# Patient Record
Sex: MALE | Race: WHITE | NOT HISPANIC OR LATINO | Employment: OTHER | URBAN - METROPOLITAN AREA
[De-identification: names, ages, dates, MRNs, and addresses within clinical notes are randomized per-mention and may not be internally consistent; named-entity substitution may affect disease eponyms.]

---

## 2017-03-01 RX ORDER — LANCETS
EACH MISCELLANEOUS
Refills: 0 | OUTPATIENT
Start: 2017-03-01

## 2017-03-01 NOTE — TELEPHONE ENCOUNTER
Lancets      Last Written Prescription Date: 8/23/16  Last Fill Quantity: 1 box,  # refills: 11   Last Office Visit with G, P or [facility] prescribing provider: 7/12/16    Rhonda Fry CMA

## 2017-03-01 NOTE — TELEPHONE ENCOUNTER
Patient should have 6 months of supplies left, request denied.   Nadia Johnson RN   [facility] - Triage

## 2017-03-14 ENCOUNTER — OFFICE VISIT (OUTPATIENT)
Dept: FAMILY MEDICINE | Facility: CLINIC | Age: 72
End: 2017-03-14
Payer: COMMERCIAL

## 2017-03-14 VITALS
WEIGHT: 145 LBS | HEART RATE: 63 BPM | SYSTOLIC BLOOD PRESSURE: 128 MMHG | DIASTOLIC BLOOD PRESSURE: 74 MMHG | BODY MASS INDEX: 20.3 KG/M2 | TEMPERATURE: 97.6 F | OXYGEN SATURATION: 97 % | HEIGHT: 71 IN

## 2017-03-14 DIAGNOSIS — E78.2 MIXED HYPERLIPIDEMIA: ICD-10-CM

## 2017-03-14 DIAGNOSIS — Z23 NEED FOR VACCINATION: ICD-10-CM

## 2017-03-14 LAB
CREAT UR-MCNC: 127 MG/DL
HBA1C MFR BLD: 6.8 % (ref 4.3–6)
MICROALBUMIN UR-MCNC: 48 MG/L
MICROALBUMIN/CREAT UR: 37.4 MG/G CR (ref 0–17)

## 2017-03-14 PROCEDURE — 36415 COLL VENOUS BLD VENIPUNCTURE: CPT | Performed by: FAMILY MEDICINE

## 2017-03-14 PROCEDURE — 83036 HEMOGLOBIN GLYCOSYLATED A1C: CPT | Performed by: FAMILY MEDICINE

## 2017-03-14 PROCEDURE — 82043 UR ALBUMIN QUANTITATIVE: CPT | Performed by: FAMILY MEDICINE

## 2017-03-14 PROCEDURE — 90715 TDAP VACCINE 7 YRS/> IM: CPT | Performed by: FAMILY MEDICINE

## 2017-03-14 PROCEDURE — G0009 ADMIN PNEUMOCOCCAL VACCINE: HCPCS | Performed by: FAMILY MEDICINE

## 2017-03-14 PROCEDURE — 80061 LIPID PANEL: CPT | Performed by: FAMILY MEDICINE

## 2017-03-14 PROCEDURE — 80048 BASIC METABOLIC PNL TOTAL CA: CPT | Performed by: FAMILY MEDICINE

## 2017-03-14 PROCEDURE — 90670 PCV13 VACCINE IM: CPT | Performed by: FAMILY MEDICINE

## 2017-03-14 PROCEDURE — 99213 OFFICE O/P EST LOW 20 MIN: CPT | Mod: 25 | Performed by: FAMILY MEDICINE

## 2017-03-14 PROCEDURE — 84460 ALANINE AMINO (ALT) (SGPT): CPT | Performed by: FAMILY MEDICINE

## 2017-03-14 PROCEDURE — G0008 ADMIN INFLUENZA VIRUS VAC: HCPCS | Performed by: FAMILY MEDICINE

## 2017-03-14 RX ORDER — SIMVASTATIN 20 MG
20 TABLET ORAL AT BEDTIME
Qty: 90 TABLET | Refills: 3 | Status: SHIPPED | OUTPATIENT
Start: 2017-03-14 | End: 2018-02-21

## 2017-03-14 RX ORDER — METFORMIN HYDROCHLORIDE 750 MG/1
TABLET, EXTENDED RELEASE ORAL
Qty: 90 TABLET | Refills: 1 | Status: CANCELLED | OUTPATIENT
Start: 2017-03-14

## 2017-03-14 RX ORDER — METFORMIN HYDROCHLORIDE 750 MG/1
TABLET, EXTENDED RELEASE ORAL
Qty: 90 TABLET | Refills: 1 | Status: SHIPPED | OUTPATIENT
Start: 2017-03-14 | End: 2017-09-13

## 2017-03-14 NOTE — MR AVS SNAPSHOT
After Visit Summary   3/14/2017    Yury Noriega    MRN: [id number]           Patient Information     Date Of Birth          1945        Visit Information        Provider Department      3/14/2017 10:20 AM Zhou Emmanuel MD [facility]        Today's Diagnoses     Uncontrolled diabetes mellitus type 2 without complications, unspecified long term insulin use status (H)    -  1    Mixed hyperlipidemia           Follow-ups after your visit        Your next 10 appointments already scheduled     Mar 14, 2017 10:20 AM CDT   Office Visit with Zhou Emmanuel MD   [facility] ([facility])    [address] [place] [id number]   [phone]           Bring a current list of meds and any records pertaining to this visit.  For Physicals, please bring immunization records and any forms needing to be filled out.  Please arrive 10 minutes early to complete paperwork.              Who to contact     If you have questions or need follow up information about today's clinic visit or your schedule please contact [facility] directly at [phone].  Normal or non-critical lab and imaging results will be communicated to you by Storm Exchangehart, letter or phone within 4 business days after the clinic has received the results. If you do not hear from us within 7 days, please contact the clinic through Orggert or phone. If you have a critical or abnormal lab result, we will notify you by phone as soon as possible.  Submit refill requests through [employer] or call your pharmacy and they will forward the refill request to us. Please allow 3 business days for your refill to be completed.          Additional Information About Your Visit        Storm Exchangehart Information     [employer] gives you secure access to your electronic health record. If you see a primary care provider, you can also send messages to your care team and make appointments. If you  "have questions, please call your primary care clinic.  If you do not have a primary care provider, please call [phone] and they will assist you.        Care EveryWhere ID     This is your Care EveryWhere ID. This could be used by other organizations to access your [facility] medical records  [id number]        Your Vitals Were     Pulse Temperature Height Pulse Oximetry BMI (Body Mass Index)       63 97.6  F (36.4  C) (Tympanic) 5' 10.5\" (1.791 m) 97% 20.51 kg/m2        Blood Pressure from Last 3 Encounters:   03/14/17 128/74   07/12/16 120/74   02/09/16 132/72    Weight from Last 3 Encounters:   03/14/17 145 lb (65.8 kg)   07/12/16 150 lb (68 kg)   02/09/16 152 lb (68.9 kg)              We Performed the Following     Albumin Random Urine Quantitative     ALT     Basic metabolic panel  (Ca, Cl, CO2, Creat, Gluc, K, Na, BUN)     FOOT EXAM     Hemoglobin A1c     Lipid Profile with reflex to direct LDL          Today's Medication Changes          These changes are accurate as of: 3/14/17 10:19 AM.  If you have any questions, ask your nurse or doctor.               These medicines have changed or have updated prescriptions.        Dose/Directions    metFORMIN 750 MG 24 hr tablet   Commonly known as:  GLUCOPHAGE-XR   This may have changed:  See the new instructions.   Used for:  Uncontrolled diabetes mellitus type 2 without complications, unspecified long term insulin use status (H), Mixed hyperlipidemia   Changed by:  Zhou Emmanuel MD        TAKE 1 TABLET BY MOUTH DAILY WITH DINNER   Quantity:  90 tablet   Refills:  1       simvastatin 20 MG tablet   Commonly known as:  ZOCOR   This may have changed:  See the new instructions.   Used for:  Uncontrolled diabetes mellitus type 2 without complications, unspecified long term insulin use status (H), Mixed hyperlipidemia   Changed by:  Zhou Emmanuel MD        Dose:  20 mg   Take 1 tablet (20 mg) by mouth At Bedtime   Quantity:  90 tablet   Refills:  3            Where to " get your medicines      These medications were sent to [facility] - [place], [place] - [address]  AT [facility] [address]  [address] , [facility] [address]     Phone:  [phone]     blood glucose monitoring lancets    metFORMIN 750 MG 24 hr tablet    simvastatin 20 MG tablet                Primary Care Provider Office Phone # Fax #    Zhou Emmanuel [phone]       [address] [address]        Thank you!     Thank you for choosing [facility]  for your care. Our goal is always to provide you with excellent care. Hearing back from our patients is one way we can continue to improve our services. Please take a few minutes to complete the written survey that you may receive in the mail after your visit with us. Thank you!             Your Updated Medication List - Protect others around you: Learn how to safely use, store and throw away your medicines at www.disposemymeds.org.          This list is accurate as of: 3/14/17 10:19 AM.  Always use your most recent med list.                   Brand Name Dispense Instructions for use    * blood glucose monitoring lancets     1 Box    Test 1 time per day       * blood glucose monitoring lancets     1 Box    Use to test blood sugar 1 times daily or as directed.       blood glucose monitoring meter device kit     1 kit    Use to test blood sugars 1 times daily or as directed.       * blood glucose monitoring test strip    ACCU-CHEK SMARTVIEW    100 each    Use to test blood sugar 1 times daily or as directed.       * blood glucose monitoring test strip    no brand specified    100 each    Use to test blood sugars 1 times daily or as directed       * blood glucose monitoring test strip    no brand specified    1 Box    Use to test blood sugars 1 times daily or as directed       lisinopril 2.5 MG tablet    PRINIVIL/Zestril    90 tablet    TAKE 1 TABLET BY MOUTH  DAILY       metFORMIN 750 MG 24 hr tablet    GLUCOPHAGE-XR    90 tablet    TAKE 1 TABLET BY MOUTH DAILY WITH DINNER       Multi-vitamin Tabs tablet      Take 1 tablet by mouth daily       simvastatin 20 MG tablet    ZOCOR    90 tablet    Take 1 tablet (20 mg) by mouth At Bedtime       * Notice:  This list has 5 medication(s) that are the same as other medications prescribed for you. Read the directions carefully, and ask your doctor or other care provider to review them with you.

## 2017-03-14 NOTE — NURSING NOTE
"Chief Complaint   Patient presents with     Diabetes       Initial /74  Pulse 63  Temp 97.6  F (36.4  C) (Tympanic)  Ht 5' 10.5\" (1.791 m)  Wt 145 lb (65.8 kg)  SpO2 97%  BMI 20.51 kg/m2 Estimated body mass index is 20.51 kg/(m^2) as calculated from the following:    Height as of this encounter: 5' 10.5\" (1.791 m).    Weight as of this encounter: 145 lb (65.8 kg).  Medication Reconciliation: complete     Rhonda Fry CMA      "

## 2017-03-14 NOTE — PROGRESS NOTES
SUBJECTIVE:                                                    Yury Noriega is a 71 year old male who presents to clinic today for the following health issues:      Diabetes Follow-up    Patient is checking blood sugars: once daily.  Results are as follows:         am - 114    Diabetic concerns: None     Symptoms of hypoglycemia (low blood sugar): none     Paresthesias (numbness or burning in feet) or sores: No     Date of last diabetic eye exam: 3/2015       Amount of exercise or physical activity: walking    Problems taking medications regularly: No    Medication side effects: none  Diet: regular (no restrictions)    Problem list and histories reviewed & adjusted, as indicated.  Additional history: as documented    Patient Active Problem List   Diagnosis     BPH (benign prostatic hyperplasia)     CARDIOVASCULAR SCREENING; LDL GOAL LESS THAN 160     Type 2 diabetes mellitus, uncontrolled (H)     Advance Care Planning     Past Surgical History   Procedure Laterality Date     As closed rx femur shaft fx       Colonoscopy N/A 1/5/2016     Procedure: COLONOSCOPY;  Surgeon: Jose Luis Nunez MD;  Location:  GI       Social History   Substance Use Topics     Smoking status: Never Smoker     Smokeless tobacco: Never Used     Alcohol use Yes      Comment: rarely     Family History   Problem Relation Age of Onset     DIABETES Mother          Current Outpatient Prescriptions   Medication Sig Dispense Refill     blood glucose monitoring (ACCU-CHEK MULTICLIX) lancets Use to test blood sugar 1 times daily or as directed. 1 Box 11     simvastatin (ZOCOR) 20 MG tablet Take 1 tablet (20 mg) by mouth At Bedtime 90 tablet 3     metFORMIN (GLUCOPHAGE-XR) 750 MG 24 hr tablet TAKE 1 TABLET BY MOUTH DAILY WITH DINNER 90 tablet 1     blood glucose monitoring (ACCU-CHEK DIONICIO PLUS) meter device kit Use to test blood sugars 1 times daily or as directed. 1 kit 0     blood glucose monitoring (NO BRAND SPECIFIED) test strip Use to  "test blood sugars 1 times daily or as directed 1 Box 11     lisinopril (PRINIVIL,ZESTRIL) 2.5 MG tablet TAKE 1 TABLET BY MOUTH DAILY 90 tablet 3     blood glucose monitoring (ACCU-CHEK FASTCLIX) lancets Test 1 time per day 1 Box 3     blood glucose monitoring (NO BRAND SPECIFIED) test strip Use to test blood sugars 1 times daily or as directed 100 each 3     blood glucose monitoring (ACCU-CHEK SMARTVIEW) test strip Use to test blood sugar 1 times daily or as directed. 100 each 2     multivitamin, therapeutic with minerals (MULTI-VITAMIN) TABS Take 1 tablet by mouth daily       [DISCONTINUED] metFORMIN (GLUCOPHAGE-XR) 750 MG 24 hr tablet TAKE 1 TABLET BY MOUTH DAILY WITH DINNER 90 tablet 1     [DISCONTINUED] simvastatin (ZOCOR) 20 MG tablet TAKE 1 TABLET BY MOUTH EVERY NIGHT AT BEDTIME 90 tablet 3     No Known Allergies  Recent Labs   Lab Test  07/12/16   0921  02/09/16   1206  12/01/15   0903  10/19/15   1116   A1C  6.9*  6.6*  8.0*   --    LDL   --   59   --   167*   TRIG   --    --   78   --    CR  1.12   --   1.00   --    GFRESTIMATED  65   --   74   --    GFRESTBLACK  79   --   90   --    POTASSIUM  4.7   --   3.9   --       BP Readings from Last 3 Encounters:   03/14/17 128/74   07/12/16 120/74   02/09/16 132/72    Wt Readings from Last 3 Encounters:   03/14/17 145 lb (65.8 kg)   07/12/16 150 lb (68 kg)   02/09/16 152 lb (68.9 kg)                    Reviewed and updated as needed this visit by clinical staff  Tobacco  Allergies  Meds       Reviewed and updated as needed this visit by Provider         ROS:  Constitutional, HEENT, cardiovascular, pulmonary, gi and gu systems are negative, except as otherwise noted.    OBJECTIVE:                                                    /74  Pulse 63  Temp 97.6  F (36.4  C) (Tympanic)  Ht 5' 10.5\" (1.791 m)  Wt 145 lb (65.8 kg)  SpO2 97%  BMI 20.51 kg/m2  Body mass index is 20.51 kg/(m^2).  GENERAL: healthy, alert and no distress  NECK: no adenopathy, no " asymmetry, masses, or scars and thyroid normal to palpation  RESP: lungs clear to auscultation - no rales, rhonchi or wheezes  CV: regular rate and rhythm, normal S1 S2, no S3 or S4, no murmur, click or rub, no peripheral edema and peripheral pulses strong  ABDOMEN: soft, nontender, no hepatosplenomegaly, no masses and bowel sounds normal  MS: no gross musculoskeletal defects noted, no edema         ASSESSMENT/PLAN:                                                    Yury was seen today for diabetes.    Diagnoses and all orders for this visit:    Uncontrolled diabetes mellitus type 2 without complications, unspecified long term insulin use status (H)  -     blood glucose monitoring (ACCU-CHEK MULTICLIX) lancets; Use to test blood sugar 1 times daily or as directed.  -     simvastatin (ZOCOR) 20 MG tablet; Take 1 tablet (20 mg) by mouth At Bedtime  -     metFORMIN (GLUCOPHAGE-XR) 750 MG 24 hr tablet; TAKE 1 TABLET BY MOUTH DAILY WITH DINNER  -     Hemoglobin A1c  -     Basic metabolic panel  (Ca, Cl, CO2, Creat, Gluc, K, Na, BUN)  -     ALT  -     Lipid Profile with reflex to direct LDL  -     Albumin Random Urine Quantitative  -     FOOT EXAM    Mixed hyperlipidemia  -     blood glucose monitoring (ACCU-CHEK MULTICLIX) lancets; Use to test blood sugar 1 times daily or as directed.  -     simvastatin (ZOCOR) 20 MG tablet; Take 1 tablet (20 mg) by mouth At Bedtime  -     metFORMIN (GLUCOPHAGE-XR) 750 MG 24 hr tablet; TAKE 1 TABLET BY MOUTH DAILY WITH DINNER  -     Hemoglobin A1c  -     Basic metabolic panel  (Ca, Cl, CO2, Creat, Gluc, K, Na, BUN)  -     ALT  -     Lipid Profile with reflex to direct LDL  -     Albumin Random Urine Quantitative    Other orders  -     Cancel: metFORMIN (GLUCOPHAGE-XR) 750 MG 24 hr tablet;           FUTURE APPOINTMENTS:       - Follow-up visit in 6 months     Zhou Emmanuel MD  [facility]

## 2017-03-15 LAB
ALT SERPL W P-5'-P-CCNC: 22 U/L (ref 0–70)
ANION GAP SERPL CALCULATED.3IONS-SCNC: 8 MMOL/L (ref 3–14)
BUN SERPL-MCNC: 30 MG/DL (ref 7–30)
CALCIUM SERPL-MCNC: 8.8 MG/DL (ref 8.5–10.1)
CHLORIDE SERPL-SCNC: 101 MMOL/L (ref 94–109)
CHOLEST SERPL-MCNC: 158 MG/DL
CO2 SERPL-SCNC: 28 MMOL/L (ref 20–32)
CREAT SERPL-MCNC: 0.9 MG/DL (ref 0.66–1.25)
GFR SERPL CREATININE-BSD FRML MDRD: 84 ML/MIN/1.7M2
GLUCOSE SERPL-MCNC: 136 MG/DL (ref 70–99)
HDLC SERPL-MCNC: 55 MG/DL
LDLC SERPL CALC-MCNC: 87 MG/DL
NONHDLC SERPL-MCNC: 103 MG/DL
POTASSIUM SERPL-SCNC: 3.7 MMOL/L (ref 3.4–5.3)
SODIUM SERPL-SCNC: 137 MMOL/L (ref 133–144)
TRIGL SERPL-MCNC: 79 MG/DL

## 2017-03-16 ENCOUNTER — TELEPHONE (OUTPATIENT)
Dept: FAMILY MEDICINE | Facility: CLINIC | Age: 72
End: 2017-03-16

## 2017-03-16 NOTE — TELEPHONE ENCOUNTER
Spoke with pt he will check with his insurance and call back and let us know what machine is covered.    Rhonda Fry CMA

## 2017-03-16 NOTE — TELEPHONE ENCOUNTER
Fax from pt's pharmacy stating his accu-check lancets are not covered.    Do you want to change med or proceed with PA?    Rhonda Fry CMA

## 2017-07-03 ENCOUNTER — TRANSFERRED RECORDS (OUTPATIENT)
Dept: HEALTH INFORMATION MANAGEMENT | Facility: CLINIC | Age: 72
End: 2017-07-03

## 2017-08-07 RX ORDER — LISINOPRIL 2.5 MG/1
TABLET ORAL
Qty: 90 TABLET | Refills: 1 | Status: SHIPPED | OUTPATIENT
Start: 2017-08-07 | End: 2018-01-29

## 2017-08-07 NOTE — TELEPHONE ENCOUNTER
Lisinopril      Last Written Prescription Date: 8/9/16  Last Fill Quantity: 90, # refills: 3  Last Office Visit with [facility], [facility] or [facility] prescribing provider: 3/14/17       Potassium   Date Value Ref Range Status   03/14/2017 3.7 3.4 - 5.3 mmol/L Final     Creatinine   Date Value Ref Range Status   03/14/2017 0.90 0.66 - 1.25 mg/dL Final     BP Readings from Last 3 Encounters:   03/14/17 128/74   07/12/16 120/74   02/09/16 132/72     Rhonda Fry CMA

## 2017-08-07 NOTE — TELEPHONE ENCOUNTER
Prescription approved per [facility], [facility] or [facility] refill protocol.  Lindsey Mendez RN - Triage  [facility]

## 2017-09-13 ENCOUNTER — OFFICE VISIT (OUTPATIENT)
Dept: FAMILY MEDICINE | Facility: CLINIC | Age: 72
End: 2017-09-13
Payer: COMMERCIAL

## 2017-09-13 VITALS
HEIGHT: 71 IN | SYSTOLIC BLOOD PRESSURE: 125 MMHG | BODY MASS INDEX: 20.3 KG/M2 | HEART RATE: 79 BPM | RESPIRATION RATE: 16 BRPM | WEIGHT: 145 LBS | DIASTOLIC BLOOD PRESSURE: 80 MMHG | OXYGEN SATURATION: 97 % | TEMPERATURE: 97.1 F

## 2017-09-13 DIAGNOSIS — Z11.59 NEED FOR HEPATITIS C SCREENING TEST: ICD-10-CM

## 2017-09-13 DIAGNOSIS — Z23 NEED FOR PROPHYLACTIC VACCINATION AND INOCULATION AGAINST INFLUENZA: ICD-10-CM

## 2017-09-13 DIAGNOSIS — E78.2 MIXED HYPERLIPIDEMIA: ICD-10-CM

## 2017-09-13 LAB — HBA1C MFR BLD: 6.5 % (ref 4.3–6)

## 2017-09-13 PROCEDURE — 90471 IMMUNIZATION ADMIN: CPT | Performed by: FAMILY MEDICINE

## 2017-09-13 PROCEDURE — 84460 ALANINE AMINO (ALT) (SGPT): CPT | Performed by: FAMILY MEDICINE

## 2017-09-13 PROCEDURE — 90662 IIV NO PRSV INCREASED AG IM: CPT | Performed by: FAMILY MEDICINE

## 2017-09-13 PROCEDURE — 36415 COLL VENOUS BLD VENIPUNCTURE: CPT | Performed by: FAMILY MEDICINE

## 2017-09-13 PROCEDURE — 80048 BASIC METABOLIC PNL TOTAL CA: CPT | Performed by: FAMILY MEDICINE

## 2017-09-13 PROCEDURE — 86803 HEPATITIS C AB TEST: CPT | Performed by: FAMILY MEDICINE

## 2017-09-13 PROCEDURE — 99214 OFFICE O/P EST MOD 30 MIN: CPT | Mod: 25 | Performed by: FAMILY MEDICINE

## 2017-09-13 PROCEDURE — 83036 HEMOGLOBIN GLYCOSYLATED A1C: CPT | Performed by: FAMILY MEDICINE

## 2017-09-13 RX ORDER — METFORMIN HYDROCHLORIDE 750 MG/1
TABLET, EXTENDED RELEASE ORAL
Qty: 90 TABLET | Refills: 1 | Status: SHIPPED | OUTPATIENT
Start: 2017-09-13 | End: 2018-02-21

## 2017-09-13 NOTE — NURSING NOTE
"Chief Complaint   Patient presents with     Diabetes       Initial /80 (Cuff Size: Adult Regular)  Pulse 79  Temp 97.1  F (36.2  C) (Tympanic)  Resp 16  Ht 5' 10.5\" (1.791 m)  Wt 145 lb (65.8 kg)  SpO2 97%  BMI 20.51 kg/m2 Estimated body mass index is 20.51 kg/(m^2) as calculated from the following:    Height as of this encounter: 5' 10.5\" (1.791 m).    Weight as of this encounter: 145 lb (65.8 kg).  Medication Reconciliation: complete   Meri Bustillo, CMA    "

## 2017-09-13 NOTE — PROGRESS NOTES
Injectable Influenza Immunization Documentation    1.  Are you sick today? (Fever of 100.5 or higher on the day of the clinic)   No    2.  Have you ever had Guillain-[place] Syndrome within 6 weeks of an influenza vaccionation?  No    3. Do you have a life-threatening allergy to eggs?  No    4. Do you have a life-threatening allergy to a component of the vaccine? May include antibiotics, gelatin or latex.  No     5. Have you ever had a reaction to a dose of flu vaccine that needed immediate medical attention?  No     Form completed by Meri Bustillo, [facility] [address]

## 2017-09-13 NOTE — MR AVS SNAPSHOT
After Visit Summary   9/13/2017    Yury Noriega    MRN: [id number]           Patient Information     Date Of Birth          1945        Visit Information        Provider Department      9/13/2017 1:40 PM Zhou Emmanuel MD [facility]        Today's Diagnoses     Uncontrolled diabetes mellitus type 2 without complications, unspecified long term insulin use status (H)    -  1    Need for hepatitis C screening test        Need for prophylactic vaccination and inoculation against influenza        Mixed hyperlipidemia           Follow-ups after your visit        Who to contact     If you have questions or need follow up information about today's clinic visit or your schedule please contact [facility] directly at [phone].  Normal or non-critical lab and imaging results will be communicated to you by [employer], letter or phone within 4 business days after the clinic has received the results. If you do not hear from us within 7 days, please contact the clinic through [employer] or phone. If you have a critical or abnormal lab result, we will notify you by phone as soon as possible.  Submit refill requests through ROCKI or call your pharmacy and they will forward the refill request to us. Please allow 3 business days for your refill to be completed.          Additional Information About Your Visit        MyChart Information     [employer] gives you secure access to your electronic health record. If you see a primary care provider, you can also send messages to your care team and make appointments. If you have questions, please call your primary care clinic.  If you do not have a primary care provider, please call [phone] and they will assist you.        Care EveryWhere ID     This is your Care EveryWhere ID. This could be used by other organizations to access your [place] medical records  [id number]        Your Vitals Were     Pulse Temperature  "Respirations Height Pulse Oximetry BMI (Body Mass Index)    79 97.1  F (36.2  C) (Tympanic) 16 5' 10.5\" (1.791 m) 97% 20.51 kg/m2       Blood Pressure from Last 3 Encounters:   09/13/17 125/80   03/14/17 128/74   07/12/16 120/74    Weight from Last 3 Encounters:   09/13/17 145 lb (65.8 kg)   03/14/17 145 lb (65.8 kg)   07/12/16 150 lb (68 kg)              We Performed the Following     ALT     Basic metabolic panel  (Ca, Cl, CO2, Creat, Gluc, K, Na, BUN)     FLU VACCINE, INCREASED ANTIGEN, PRESV FREE, AGE 65+ [91660]     HEMOGLOBIN A1C     Hepatitis C Screen Reflex to HCV RNA Quant and Genotype     Vaccine Administration, Initial [73840]          Where to get your medicines      These medications were sent to [facility] [address] [place] [address] [place] [address] [address] AT [facility]  [address] [place] [address]     Phone:  [phone]     metFORMIN 750 MG 24 hr tablet          Primary Care Provider Office Phone # Fax #    Zhou Emmanuel [phone]       [address] [facility] [address]        Equal Access to Services     VAUGHN TAVARES AH: Hadii mercedes ku hadasho Soomaali, waaxda luqadaha, qaybta kaalmada adeegyada, juana painting. So [facility] [phone].    ATENCIÓN: Si habla español, tiene a warner disposición servicios gratuitos de asistencia lingüística. Llame al [phone].    We comply with applicable federal civil rights laws and [place] laws. We do not discriminate on the basis of race, color, national origin, age, disability sex, sexual orientation or gender identity.            Thank you!     Thank you for choosing [facility]  for your care. Our goal is always to provide you with excellent care. Hearing back from our patients is one way we can continue to improve our services. Please take a few minutes to complete the written survey that you may receive in the mail after your visit with us. Thank you!           "   Your Updated Medication List - Protect others around you: Learn how to safely use, store and throw away your medicines at www.disposemymeds.org.          This list is accurate as of: 9/13/17  2:03 PM.  Always use your most recent med list.                   Brand Name Dispense Instructions for use Diagnosis    blood glucose monitoring lancets     1 Box    Use to test blood sugar 1 times daily or as directed.    Uncontrolled diabetes mellitus type 2 without complications, unspecified long term insulin use status (H), Mixed hyperlipidemia       blood glucose monitoring meter device kit     1 kit    Use to test blood sugars 1 times daily or as directed.    Type 2 diabetes mellitus, uncontrolled (H)       blood glucose monitoring test strip    ACCU-CHEK SMARTVIEW    100 each    Use to test blood sugar 1 times daily or as directed.    Type 2 diabetes mellitus, uncontrolled (H)       lisinopril 2.5 MG tablet    PRINIVIL/Zestril    90 tablet    TAKE 1 TABLET BY MOUTH DAILY    Type 2 diabetes mellitus, uncontrolled (H)       metFORMIN 750 MG 24 hr tablet    GLUCOPHAGE-XR    90 tablet    TAKE 1 TABLET BY MOUTH DAILY WITH DINNER    Uncontrolled diabetes mellitus type 2 without complications, unspecified long term insulin use status (H), Mixed hyperlipidemia       Multi-vitamin Tabs tablet      Take 1 tablet by mouth daily        simvastatin 20 MG tablet    ZOCOR    90 tablet    Take 1 tablet (20 mg) by mouth At Bedtime    Uncontrolled diabetes mellitus type 2 without complications, unspecified long term insulin use status (H), Mixed hyperlipidemia

## 2017-09-14 LAB
ALT SERPL W P-5'-P-CCNC: 17 U/L (ref 0–70)
ANION GAP SERPL CALCULATED.3IONS-SCNC: 5 MMOL/L (ref 3–14)
BUN SERPL-MCNC: 25 MG/DL (ref 7–30)
CALCIUM SERPL-MCNC: 9.4 MG/DL (ref 8.5–10.1)
CHLORIDE SERPL-SCNC: 105 MMOL/L (ref 94–109)
CO2 SERPL-SCNC: 28 MMOL/L (ref 20–32)
CREAT SERPL-MCNC: 1.05 MG/DL (ref 0.66–1.25)
GFR SERPL CREATININE-BSD FRML MDRD: 70 ML/MIN/1.7M2
GLUCOSE SERPL-MCNC: 135 MG/DL (ref 70–99)
HCV AB SERPL QL IA: NONREACTIVE
POTASSIUM SERPL-SCNC: 4.2 MMOL/L (ref 3.4–5.3)
SODIUM SERPL-SCNC: 138 MMOL/L (ref 133–144)

## 2017-10-12 DIAGNOSIS — E11.9 TYPE 2 DIABETES MELLITUS WITHOUT COMPLICATION, UNSPECIFIED LONG TERM INSULIN USE STATUS: Primary | ICD-10-CM

## 2017-10-12 RX ORDER — BLOOD SUGAR DIAGNOSTIC
STRIP MISCELLANEOUS
Qty: 100 STRIP | Refills: 0 | Status: SHIPPED | OUTPATIENT
Start: 2017-10-12 | End: 2018-01-18

## 2017-10-12 NOTE — TELEPHONE ENCOUNTER
Routing refill request to provider for review/approval because:  A break in medication  Lindsey Mendez RN - Triage  [facility]

## 2017-10-12 NOTE — TELEPHONE ENCOUNTER
Accu-chek Nikki strips         Last Written Prescription Date: 12/7/15  Last Fill Quantity: 100, # refills: 2  Last Office Visit with [facility], [facility] or [facility] prescribing provider:  9/13/17        BP Readings from Last 3 Encounters:   09/13/17 125/80   03/14/17 128/74   07/12/16 120/74     Lab Results   Component Value Date    MICROL 48 03/14/2017     Lab Results   Component Value Date    UMALCR 37.40 03/14/2017     Creatinine   Date Value Ref Range Status   09/13/2017 1.05 0.66 - 1.25 mg/dL Final   ]  GFR Estimate   Date Value Ref Range Status   09/13/2017 70 >60 mL/min/1.7m2 Final     Comment:     Non  GFR Calc   03/14/2017 84 >60 mL/min/1.7m2 Final     Comment:     Non  GFR Calc   07/12/2016 65 >60 mL/min/1.7m2 Final     Comment:     Non  GFR Calc     GFR Estimate If Black   Date Value Ref Range Status   09/13/2017 85 >60 mL/min/1.7m2 Final     Comment:      GFR Calc   03/14/2017 >90   GFR Calc   >60 mL/min/1.7m2 Final   07/12/2016 79 >60 mL/min/1.7m2 Final     Comment:      GFR Calc     Lab Results   Component Value Date    CHOL 158 03/14/2017     Lab Results   Component Value Date    HDL 55 03/14/2017     Lab Results   Component Value Date    LDL 87 03/14/2017     Lab Results   Component Value Date    TRIG 79 03/14/2017     No results found for: CHOLHDLRATIO  Lab Results   Component Value Date    AST 16 02/09/2016     Lab Results   Component Value Date    ALT 17 09/13/2017     Lab Results   Component Value Date    A1C 6.5 09/13/2017    A1C 6.8 03/14/2017    A1C 6.9 07/12/2016    A1C 6.6 02/09/2016    A1C 8.0 12/01/2015     Potassium   Date Value Ref Range Status   09/13/2017 4.2 3.4 - 5.3 mmol/L Final     BK Davis LPN

## 2017-10-12 NOTE — TELEPHONE ENCOUNTER
Blood glucose monitoring (ACCU-CHEK SMARTVIEW) test strip      Last Written Prescription Date: 12/07/2015  Last Fill Quantity: 100 each,  # refills: 2   Last Office Visit with [facility], [facility] or [facility] prescribing provider: 09/13/2017    Mikala Castaneda MA

## 2018-01-18 DIAGNOSIS — E11.9 TYPE 2 DIABETES MELLITUS WITHOUT COMPLICATION, UNSPECIFIED LONG TERM INSULIN USE STATUS: ICD-10-CM

## 2018-01-18 RX ORDER — BLOOD SUGAR DIAGNOSTIC
STRIP MISCELLANEOUS
Qty: 100 STRIP | Refills: 2 | Status: SHIPPED | OUTPATIENT
Start: 2018-01-18 | End: 2018-11-07

## 2018-01-18 NOTE — TELEPHONE ENCOUNTER
Prescription approved per [facility] Refill Protocol.  Nadia Johnson RN   [facility] - Triage

## 2018-01-18 NOTE — TELEPHONE ENCOUNTER
Requested Prescriptions   Pending Prescriptions Disp Refills     ACCU-CHEK DIONICIO PLUS test strip [Pharmacy Med Name: ACCU-CHEK DIONICIO PLUS STRIPS 100'S] 100 strip 0    Last Written Prescription Date:  10/12/2017  Last Fill Quantity: 100 strip,  # refills: 0   Last Office Visit with [facility], [facility] or [facility] prescribing provider:  09/13/2017   Future Office Visit:      Sig: TEST DAILY OR AS DIRECTED    Diabetic Supplies Protocol Passed    1/18/2018  8:59 AM       Passed - Patient is 18 years of age or older       Passed - Patient has had appt within past 6 mos    IV to PO - Antibiotics     None

## 2018-02-21 DIAGNOSIS — E78.2 MIXED HYPERLIPIDEMIA: ICD-10-CM

## 2018-02-21 RX ORDER — SIMVASTATIN 20 MG
TABLET ORAL
Qty: 30 TABLET | Refills: 0 | Status: SHIPPED | OUTPATIENT
Start: 2018-02-21 | End: 2018-02-23

## 2018-02-21 RX ORDER — METFORMIN HYDROCHLORIDE 750 MG/1
TABLET, EXTENDED RELEASE ORAL
Qty: 30 TABLET | Refills: 0 | Status: SHIPPED | OUTPATIENT
Start: 2018-02-21 | End: 2018-02-23

## 2018-02-21 NOTE — TELEPHONE ENCOUNTER
Medication is being filled for 1 time refill only due to:  Patient needs to be seen because for diabetes follow up appt and labs..

## 2018-02-21 NOTE — TELEPHONE ENCOUNTER
"Requested Prescriptions   Pending Prescriptions Disp Refills     simvastatin (ZOCOR) 20 MG tablet [Pharmacy Med Name: SIMVASTATIN 20MG TABLETS]  Last Written Prescription Date:  3/14/17  Last Fill Quantity: 90 TABLET,  # refills: 3   Last office visit: 9/13/2017 with prescribing provider:  9/13/17   Future Office Visit:     90 tablet 0     Sig: TAKE 1 TABLET(20 MG) BY MOUTH AT BEDTIME    Statins Protocol Passed    2/21/2018  9:44 AM       Passed - LDL on file in past 12 months    Recent Labs   Lab Test  03/14/17   1024   LDL  87            Passed - No abnormal creatine kinase in past 12 months    No lab results found.         Passed - Recent or future visit with authorizing provider    Patient had office visit in the last year or has a visit in the next 30 days with authorizing provider.  See \"Patient Info\" tab in inbasket, or \"Choose Columns\" in Meds & Orders section of the refill encounter.            Passed - Patient is age 18 or older        metFORMIN (GLUCOPHAGE-XR) 750 MG 24 hr tablet [Pharmacy Med Name: METFORMIN ER 750MG 24HR TABS]  Last Written Prescription Date:  9/13/17  Last Fill Quantity: 90 TABLET,  # refills: 1   Last office visit: 9/13/2017 with prescribing provider:  9/13/17   Future Office Visit:     90 tablet 0     Sig: TAKE 1 TABLET BY MOUTH DAILY WITH DINNER    Biguanide Agents Passed    2/21/2018  9:44 AM       Passed - Blood pressure less than 140/90 in past 6 months    BP Readings from Last 3 Encounters:   09/13/17 125/80   03/14/17 128/74   07/12/16 120/74                Passed - Patient has documented LDL within the past 12 mos.    Recent Labs   Lab Test  03/14/17   1024   LDL  87            Passed - Patient has had a Microalbumin in the past 12 mos.    Recent Labs   Lab Test  03/14/17   1030   MICROL  48   UMALCR  37.40*            Passed - Patient is age 10 or older       Passed - Patient has documented A1c within the specified period of time.    Recent Labs   Lab Test  09/13/17   1359 " "  A1C  6.5*            Passed - Patient's CR is NOT>1.4 OR Patient's EGFR is NOT<45 within past 12 mos.    Recent Labs   Lab Test  09/13/17   1359   GFRESTIMATED  70   GFRESTBLACK  85       Recent Labs   Lab Test  09/13/17   1359   CR  1.05            Passed - Patient does NOT have a diagnosis of CHF.       Passed - Recent (6 mos) or future visit with authorizing provider's specialty    Patient had office visit in the last 6 months or has a visit in the next 30 days with authorizing provider.  See \"Patient Info\" tab in inbasket, or \"Choose Columns\" in Meds & Orders section of the refill encounter.              "

## 2018-02-23 RX ORDER — SIMVASTATIN 20 MG
TABLET ORAL
Qty: 90 TABLET | Refills: 0 | Status: SHIPPED | OUTPATIENT
Start: 2018-02-23 | End: 2018-04-12

## 2018-02-23 RX ORDER — METFORMIN HYDROCHLORIDE 750 MG/1
TABLET, EXTENDED RELEASE ORAL
Qty: 90 TABLET | Refills: 0 | Status: SHIPPED | OUTPATIENT
Start: 2018-02-23 | End: 2018-04-12

## 2018-02-23 NOTE — TELEPHONE ENCOUNTER
Please see below.  Pt was given a kervin refill and has not followed up.  Pt is now requesting another kervin refill because he states that he is going to [place].  Please advise on refill    Rhonda Garcia RN  [employer]

## 2018-02-23 NOTE — TELEPHONE ENCOUNTER
Spoke with Pt. He is leaving for [place] on Tuesday and I was not able to get him in today or Monday. He is going to call and make an appointment when he gets back. He will be out of Metformin 4 days before he gets back. Is there a way to give him and extension? Meri Bustillo, CMA

## 2018-02-28 NOTE — PROGRESS NOTES
SUBJECTIVE:   Yury Noriega is a 71 year old male who presents to clinic today for the following health issues:      Diabetes Follow-up    Patient is checking blood sugars: once daily.  Results are as follows:       am - 115-125        Diabetic concerns: None     Symptoms of hypoglycemia (low blood sugar): none     Paresthesias (numbness or burning in feet) or sores: No   Date of last diabetic eye exam: 7/3/17      Amount of exercise or physical activity: 6-7 days/week for an average of 45-60 minutes    Problems taking medications regularly: No    Medication side effects: none  Diet: regular (no restrictions)    Problem list and histories reviewed & adjusted, as indicated.  Additional history: as documented    Patient Active Problem List   Diagnosis     BPH (benign prostatic hyperplasia)     CARDIOVASCULAR SCREENING; LDL GOAL LESS THAN 160     Type 2 diabetes mellitus, uncontrolled (H)     Advance Care Planning     Past Surgical History:   Procedure Laterality Date     AS CLOSED RX FEMUR SHAFT FX       COLONOSCOPY N/A 1/5/2016    Procedure: COLONOSCOPY;  Surgeon: Jose Luis Nunez MD;  Location:  GI       Social History   Substance Use Topics     Smoking status: Never Smoker     Smokeless tobacco: Never Used     Alcohol use Yes      Comment: rarely     Family History   Problem Relation Age of Onset     DIABETES Mother          Current Outpatient Prescriptions   Medication Sig Dispense Refill     metFORMIN (GLUCOPHAGE-XR) 750 MG 24 hr tablet TAKE 1 TABLET BY MOUTH DAILY WITH DINNER 90 tablet 1     lisinopril (PRINIVIL/ZESTRIL) 2.5 MG tablet TAKE 1 TABLET BY MOUTH DAILY 90 tablet 1     blood glucose monitoring (ACCU-CHEK MULTICLIX) lancets Use to test blood sugar 1 times daily or as directed. 1 Box 11     simvastatin (ZOCOR) 20 MG tablet Take 1 tablet (20 mg) by mouth At Bedtime 90 tablet 3     blood glucose monitoring (ACCU-CHEK DIONICIO PLUS) meter device kit Use to test blood sugars 1 times daily or as  "directed. 1 kit 0     blood glucose monitoring (ACCU-CHEK SMARTVIEW) test strip Use to test blood sugar 1 times daily or as directed. 100 each 2     multivitamin, therapeutic with minerals (MULTI-VITAMIN) TABS Take 1 tablet by mouth daily       [DISCONTINUED] metFORMIN (GLUCOPHAGE-XR) 750 MG 24 hr tablet TAKE 1 TABLET BY MOUTH DAILY WITH DINNER 90 tablet 1     No Known Allergies  Recent Labs   Lab Test  03/14/17   1024  07/12/16   0921  02/09/16   1206  12/01/15   0903  10/19/15   1116   A1C  6.8*  6.9*  6.6*  8.0*   --    LDL  87   --   59   --   167*   HDL  55   --    --    --    --    TRIG  79   --    --   78   --    ALT  22   --    --    --    --    CR  0.90  1.12   --   1.00   --    GFRESTIMATED  84  65   --   74   --    GFRESTBLACK  >90   GFR Calc    79   --   90   --    POTASSIUM  3.7  4.7   --   3.9   --       BP Readings from Last 3 Encounters:   09/13/17 125/80   03/14/17 128/74   07/12/16 120/74    Wt Readings from Last 3 Encounters:   09/13/17 145 lb (65.8 kg)   03/14/17 145 lb (65.8 kg)   07/12/16 150 lb (68 kg)                Reviewed and updated as needed this visit by clinical staff     Reviewed and updated as needed this visit by Provider         ROS:  Constitutional, HEENT, cardiovascular, pulmonary, gi and gu systems are negative, except as otherwise noted.      OBJECTIVE:   /80 (Cuff Size: Adult Regular)  Pulse 79  Temp 97.1  F (36.2  C) (Tympanic)  Resp 16  Ht 5' 10.5\" (1.791 m)  Wt 145 lb (65.8 kg)  SpO2 97%  BMI 20.51 kg/m2  Body mass index is 20.51 kg/(m^2).  GENERAL: healthy, alert and no distress  NECK: no adenopathy, no asymmetry, masses, or scars and thyroid normal to palpation  RESP: lungs clear to auscultation - no rales, rhonchi or wheezes  CV: regular rate and rhythm, normal S1 S2, no S3 or S4, no murmur, click or rub, no peripheral edema and peripheral pulses strong  ABDOMEN: soft, nontender, no hepatosplenomegaly, no masses and bowel sounds normal  MS: " no gross musculoskeletal defects noted, no edema        ASSESSMENT/PLAN:     Yury was seen today for diabetes.    Diagnoses and all orders for this visit:    Uncontrolled diabetes mellitus type 2 without complications, unspecified long term insulin use status (H)  -     HEMOGLOBIN A1C  -     metFORMIN (GLUCOPHAGE-XR) 750 MG 24 hr tablet; TAKE 1 TABLET BY MOUTH DAILY WITH DINNER  -     Basic metabolic panel  (Ca, Cl, CO2, Creat, Gluc, K, Na, BUN)  -     ALT    Need for hepatitis C screening test  -     Hepatitis C Screen Reflex to HCV RNA Quant and Genotype    Need for prophylactic vaccination and inoculation against influenza    Mixed hyperlipidemia  -     HEMOGLOBIN A1C  -     metFORMIN (GLUCOPHAGE-XR) 750 MG 24 hr tablet; TAKE 1 TABLET BY MOUTH DAILY WITH DINNER  -     Basic metabolic panel  (Ca, Cl, CO2, Creat, Gluc, K, Na, BUN)  -     ALT    Other orders  -     Cancel: TSH WITH FREE T4 REFLEX      Has been stable without clinical finding of worsening, has been working on life style modification   Will review lab and update pt     F/U in 6 months     Zhou Emmanuel MD  [facility] [place]kle sprain

## 2018-04-12 ENCOUNTER — OFFICE VISIT (OUTPATIENT)
Dept: FAMILY MEDICINE | Facility: CLINIC | Age: 73
End: 2018-04-12
Payer: COMMERCIAL

## 2018-04-12 VITALS
SYSTOLIC BLOOD PRESSURE: 130 MMHG | BODY MASS INDEX: 20.9 KG/M2 | RESPIRATION RATE: 14 BRPM | HEIGHT: 70 IN | OXYGEN SATURATION: 99 % | HEART RATE: 63 BPM | WEIGHT: 146 LBS | DIASTOLIC BLOOD PRESSURE: 72 MMHG | TEMPERATURE: 96.8 F

## 2018-04-12 DIAGNOSIS — R03.0 ELEVATED BLOOD PRESSURE READING WITHOUT DIAGNOSIS OF HYPERTENSION: ICD-10-CM

## 2018-04-12 DIAGNOSIS — E78.2 MIXED HYPERLIPIDEMIA: ICD-10-CM

## 2018-04-12 DIAGNOSIS — Z23 NEED FOR VACCINATION: ICD-10-CM

## 2018-04-12 LAB — HBA1C MFR BLD: 6.4 % (ref 0–6.4)

## 2018-04-12 PROCEDURE — 82043 UR ALBUMIN QUANTITATIVE: CPT | Performed by: FAMILY MEDICINE

## 2018-04-12 PROCEDURE — 99207 C FOOT EXAM  NO CHARGE: CPT | Performed by: FAMILY MEDICINE

## 2018-04-12 PROCEDURE — 84439 ASSAY OF FREE THYROXINE: CPT | Performed by: FAMILY MEDICINE

## 2018-04-12 PROCEDURE — 83036 HEMOGLOBIN GLYCOSYLATED A1C: CPT | Performed by: FAMILY MEDICINE

## 2018-04-12 PROCEDURE — 83721 ASSAY OF BLOOD LIPOPROTEIN: CPT | Performed by: FAMILY MEDICINE

## 2018-04-12 PROCEDURE — 84460 ALANINE AMINO (ALT) (SGPT): CPT | Performed by: FAMILY MEDICINE

## 2018-04-12 PROCEDURE — 84443 ASSAY THYROID STIM HORMONE: CPT | Performed by: FAMILY MEDICINE

## 2018-04-12 PROCEDURE — 90732 PPSV23 VACC 2 YRS+ SUBQ/IM: CPT | Performed by: FAMILY MEDICINE

## 2018-04-12 PROCEDURE — 99214 OFFICE O/P EST MOD 30 MIN: CPT | Mod: 25 | Performed by: FAMILY MEDICINE

## 2018-04-12 PROCEDURE — G0009 ADMIN PNEUMOCOCCAL VACCINE: HCPCS | Performed by: FAMILY MEDICINE

## 2018-04-12 PROCEDURE — 80048 BASIC METABOLIC PNL TOTAL CA: CPT | Performed by: FAMILY MEDICINE

## 2018-04-12 PROCEDURE — 36415 COLL VENOUS BLD VENIPUNCTURE: CPT | Performed by: FAMILY MEDICINE

## 2018-04-12 RX ORDER — METFORMIN HYDROCHLORIDE 750 MG/1
TABLET, EXTENDED RELEASE ORAL
Qty: 90 TABLET | Refills: 1 | Status: SHIPPED | OUTPATIENT
Start: 2018-04-12 | End: 2018-09-27

## 2018-04-12 RX ORDER — SIMVASTATIN 20 MG
TABLET ORAL
Qty: 90 TABLET | Refills: 1 | Status: SHIPPED | OUTPATIENT
Start: 2018-04-12 | End: 2018-09-27

## 2018-04-12 NOTE — PROGRESS NOTES
SUBJECTIVE:   Yury Noriega is a 72 year old male who presents to clinic today for the following health issues:      Diabetes Follow-up    Patient is checking blood sugars: once daily.  Results are as follows:         am - 115-120    Diabetic concerns: None     Symptoms of hypoglycemia (low blood sugar): none     Paresthesias (numbness or burning in feet) or sores: No     Date of last diabetic eye exam: 7/3/17    BP Readings from Last 2 Encounters:   04/12/18 130/72   09/13/17 125/80     Hemoglobin A1C (%)   Date Value   09/13/2017 6.5 (H)   03/14/2017 6.8 (H)     LDL Cholesterol Calculated (mg/dL)   Date Value   03/14/2017 87     LDL Cholesterol Direct (mg/dL)   Date Value   02/09/2016 59   10/19/2015 167 (H)       Amount of exercise or physical activity: 6-7 days/week for an average of 15-30 minutes    Problems taking medications regularly: No    Medication side effects: none    Diet: regular (no restrictions)      Problem list and histories reviewed & adjusted, as indicated.  Additional history: as documented    Patient Active Problem List   Diagnosis     BPH (benign prostatic hyperplasia)     CARDIOVASCULAR SCREENING; LDL GOAL LESS THAN 160     Type 2 diabetes mellitus, uncontrolled (H)     Advance Care Planning     Past Surgical History:   Procedure Laterality Date     AS CLOSED RX FEMUR SHAFT FX       COLONOSCOPY N/A 1/5/2016    Procedure: COLONOSCOPY;  Surgeon: Jose Luis Nunez MD;  Location:  GI       Social History   Substance Use Topics     Smoking status: Never Smoker     Smokeless tobacco: Never Used     Alcohol use Yes      Comment: rarely     Family History   Problem Relation Age of Onset     DIABETES Mother          Current Outpatient Prescriptions   Medication Sig Dispense Refill     metFORMIN (GLUCOPHAGE-XR) 750 MG 24 hr tablet TAKE 1 TABLET BY MOUTH DAILY WITH DINNER 90 tablet 1     simvastatin (ZOCOR) 20 MG tablet TAKE 1 TABLET(20 MG) BY MOUTH AT BEDTIME 90 tablet 1     lisinopril  (PRINIVIL/ZESTRIL) 2.5 MG tablet TAKE 1 TABLET BY MOUTH DAILY 90 tablet 3     blood glucose monitoring (ACCU-CHEK MULTICLIX) lancets Use to test blood sugar 1 times daily or as directed. 1 Box 11     blood glucose monitoring (ACCU-CHEK DIONICIO PLUS) meter device kit Use to test blood sugars 1 times daily or as directed. 1 kit 0     blood glucose monitoring (ACCU-CHEK SMARTVIEW) test strip Use to test blood sugar 1 times daily or as directed. 100 each 2     multivitamin, therapeutic with minerals (MULTI-VITAMIN) TABS Take 1 tablet by mouth daily       [DISCONTINUED] simvastatin (ZOCOR) 20 MG tablet TAKE 1 TABLET(20 MG) BY MOUTH AT BEDTIME 90 tablet 0     [DISCONTINUED] metFORMIN (GLUCOPHAGE-XR) 750 MG 24 hr tablet TAKE 1 TABLET BY MOUTH DAILY WITH DINNER 90 tablet 0     ACCU-CHEK DIONICIO PLUS test strip TEST DAILY OR AS DIRECTED (Patient not taking: Reported on 4/12/2018) 100 strip 2     No Known Allergies  Recent Labs   Lab Test  09/13/17   1359  03/14/17   1024  07/12/16   0921  02/09/16   1206  12/01/15   0903  10/19/15   1116   A1C  6.5*  6.8*  6.9*  6.6*  8.0*   --    LDL   --   87   --   59   --   167*   HDL   --   55   --    --    --    --    TRIG   --   79   --    --   78   --    ALT  17  22   --    --    --    --    CR  1.05  0.90  1.12   --   1.00   --    GFRESTIMATED  70  84  65   --   74   --    GFRESTBLACK  85  >90   GFR Calc    79   --   90   --    POTASSIUM  4.2  3.7  4.7   --   3.9   --       BP Readings from Last 3 Encounters:   04/12/18 130/72   09/13/17 125/80   03/14/17 128/74    Wt Readings from Last 3 Encounters:   04/12/18 146 lb (66.2 kg)   09/13/17 145 lb (65.8 kg)   03/14/17 145 lb (65.8 kg)                    Reviewed and updated as needed this visit by clinical staff       Reviewed and updated as needed this visit by Provider         ROS:  Constitutional, HEENT, cardiovascular, pulmonary, gi and gu systems are negative, except as otherwise noted.    OBJECTIVE:     /72  "(Cuff Size: Adult Regular)  Pulse 63  Temp 96.8  F (36  C) (Tympanic)  Resp 14  Ht 5' 10.25\" (1.784 m)  Wt 146 lb (66.2 kg)  SpO2 99%  BMI 20.8 kg/m2  Body mass index is 20.8 kg/(m^2).  GENERAL: healthy, alert and no distress  NECK: no adenopathy, no asymmetry, masses, or scars and thyroid normal to palpation  RESP: lungs clear to auscultation - no rales, rhonchi or wheezes  CV: regular rate and rhythm, normal S1 S2, no S3 or S4, no murmur, click or rub, no peripheral edema and peripheral pulses strong  ABDOMEN: soft, nontender, no hepatosplenomegaly, no masses and bowel sounds normal  MS: no gross musculoskeletal defects noted, no edema        ASSESSMENT/PLAN:   (E11.65) Uncontrolled diabetes mellitus type 2 without complications, unspecified long term insulin use status (H)  (primary encounter diagnosis)  Comment: has been stable with current dose of meds, has no side effect from the meds, will keep watching sx   Plan: TSH with free T4 reflex, Hemoglobin A1c,         Albumin Random Urine Quantitative with Creat         Ratio, Basic metabolic panel, ALT, FOOT EXAM,         LDL cholesterol direct, metFORMIN         (GLUCOPHAGE-XR) 750 MG 24 hr tablet,         simvastatin (ZOCOR) 20 MG tablet            (E78.2) Mixed hyperlipidemia  Comment: stable , has no side effect from the meds, will keep watching sx   Plan: metFORMIN (GLUCOPHAGE-XR) 750 MG 24 hr tablet,         simvastatin (ZOCOR) 20 MG tablet            (R03.0) Elevated blood pressure reading without diagnosis of hypertension  Comment: initial BP was at 150/70's, and dropped back to normal around 130/70's, has no clinical sx  Plan: will keep watching sx, encouraged him to keep working on life style modification       FUTURE APPOINTMENTS:       - Follow-up visit in 6 months     Zhou Emmanuel MD  [facility] [place]    "

## 2018-04-12 NOTE — NURSING NOTE
"Chief Complaint   Patient presents with     Diabetes       Initial /72 (Cuff Size: Adult Regular)  Pulse 63  Temp 96.8  F (36  C) (Tympanic)  Resp 14  Ht 5' 10.25\" (1.784 m)  Wt 146 lb (66.2 kg)  SpO2 99%  BMI 20.8 kg/m2 Estimated body mass index is 20.8 kg/(m^2) as calculated from the following:    Height as of this encounter: 5' 10.25\" (1.784 m).    Weight as of this encounter: 146 lb (66.2 kg).  Medication Reconciliation: complete   Meri Bustillo, CMA    "

## 2018-04-12 NOTE — MR AVS SNAPSHOT
After Visit Summary   4/12/2018    Yury Noriega    MRN: [id number]           Patient Information     Date Of Birth          1945        Visit Information        Provider Department      4/12/2018 11:00 AM Zhou Emmanuel MD [facility] [place]        Today's Diagnoses     Uncontrolled diabetes mellitus type 2 without complications, unspecified long term insulin use status (H)    -  1    Mixed hyperlipidemia        Elevated blood pressure reading without diagnosis of hypertension           Follow-ups after your visit        Follow-up notes from your care team     Return in about 6 months (around 10/12/2018) for DM recheck, BP Recheck, Routine Visit.      Who to contact     If you have questions or need follow up information about today's clinic visit or your schedule please contact [facility] [place] directly at [phone].  Normal or non-critical lab and imaging results will be communicated to you by MyChart, letter or phone within 4 business days after the clinic has received the results. If you do not hear from us within 7 days, please contact the clinic through [employer] or phone. If you have a critical or abnormal lab result, we will notify you by phone as soon as possible.  Submit refill requests through [employer] or call your pharmacy and they will forward the refill request to us. Please allow 3 business days for your refill to be completed.          Additional Information About Your Visit        MyChart Information     [employer] gives you secure access to your electronic health record. If you see a primary care provider, you can also send messages to your care team and make appointments. If you have questions, please call your primary care clinic.  If you do not have a primary care provider, please call [phone] and they will assist you.        Care EveryWhere ID     This is your Care EveryWhere ID. This could be used by other organizations to access your  "[facility] medical records  [id number]        Your Vitals Were     Pulse Temperature Respirations Height Pulse Oximetry BMI (Body Mass Index)    63 96.8  F (36  C) (Tympanic) 14 5' 10.25\" (1.784 m) 99% 20.8 kg/m2       Blood Pressure from Last 3 Encounters:   04/12/18 130/72   09/13/17 125/80   03/14/17 128/74    Weight from Last 3 Encounters:   04/12/18 146 lb (66.2 kg)   09/13/17 145 lb (65.8 kg)   03/14/17 145 lb (65.8 kg)              We Performed the Following     Albumin Random Urine Quantitative with Creat Ratio     ALT     Basic metabolic panel     FOOT EXAM     Hemoglobin A1c     LDL cholesterol direct     TSH with free T4 reflex          Where to get your medicines      These medications were sent to [facility] [address] [place] [address] [place], [place] - [address] AT [facility]  [address], [place] [address]     Phone:  [phone]     metFORMIN 750 MG 24 hr tablet    simvastatin 20 MG tablet          Primary Care Provider Office Phone # Fax #    Zhou Emmanuel [phone]       [address] [facility] [address]        Equal Access to Services     VAUGHN TAVARES AH: Hadii mercedes ku hadasho Soomaali, waaxda luqadaha, qaybta kaalmada adeegyada, waxay idiin hayrogelion constanza painting. So [facility] [phone].    ATENCIÓN: Si habla español, tiene a warner disposición servicios gratuitos de asistencia lingüística. Llame al [phone].    We comply with applicable federal civil rights laws and [place] laws. We do not discriminate on the basis of race, color, national origin, age, disability, sex, sexual orientation, or gender identity.            Thank you!     Thank you for choosing [facility]  for your care. Our goal is always to provide you with excellent care. Hearing back from our patients is one way we can continue to improve our services. Please take a few minutes to complete the written survey that you may receive in the mail after your visit " with us. Thank you!             Your Updated Medication List - Protect others around you: Learn how to safely use, store and throw away your medicines at www.disposemymeds.org.          This list is accurate as of 4/12/18 11:11 AM.  Always use your most recent med list.                   Brand Name Dispense Instructions for use Diagnosis    blood glucose monitoring lancets     1 Box    Use to test blood sugar 1 times daily or as directed.    Uncontrolled diabetes mellitus type 2 without complications, unspecified long term insulin use status (H), Mixed hyperlipidemia       blood glucose monitoring meter device kit     1 kit    Use to test blood sugars 1 times daily or as directed.    Type 2 diabetes mellitus, uncontrolled (H)       * blood glucose monitoring test strip    ACCU-CHEK SMARTVIEW    100 each    Use to test blood sugar 1 times daily or as directed.    Type 2 diabetes mellitus, uncontrolled (H)       * ACCU-CHEK DIONICIO PLUS test strip   Generic drug:  blood glucose monitoring     100 strip    TEST DAILY OR AS DIRECTED    Type 2 diabetes mellitus without complication, unspecified long term insulin use status (H)       lisinopril 2.5 MG tablet    PRINIVIL/Zestril    90 tablet    TAKE 1 TABLET BY MOUTH DAILY    Type 2 diabetes mellitus, uncontrolled (H)       metFORMIN 750 MG 24 hr tablet    GLUCOPHAGE-XR    90 tablet    TAKE 1 TABLET BY MOUTH DAILY WITH DINNER    Uncontrolled diabetes mellitus type 2 without complications, unspecified long term insulin use status (H), Mixed hyperlipidemia       Multi-vitamin Tabs tablet      Take 1 tablet by mouth daily        simvastatin 20 MG tablet    ZOCOR    90 tablet    TAKE 1 TABLET(20 MG) BY MOUTH AT BEDTIME    Uncontrolled diabetes mellitus type 2 without complications, unspecified long term insulin use status (H), Mixed hyperlipidemia       * Notice:  This list has 2 medication(s) that are the same as other medications prescribed for you. Read the directions  carefully, and ask your doctor or other care provider to review them with you.

## 2018-04-13 LAB
ALT SERPL W P-5'-P-CCNC: 14 U/L (ref 0–70)
ANION GAP SERPL CALCULATED.3IONS-SCNC: 4 MMOL/L (ref 3–14)
BUN SERPL-MCNC: 21 MG/DL (ref 7–30)
CALCIUM SERPL-MCNC: 9.2 MG/DL (ref 8.5–10.1)
CHLORIDE SERPL-SCNC: 106 MMOL/L (ref 94–109)
CO2 SERPL-SCNC: 29 MMOL/L (ref 20–32)
CREAT SERPL-MCNC: 1.03 MG/DL (ref 0.66–1.25)
CREAT UR-MCNC: 121 MG/DL
GFR SERPL CREATININE-BSD FRML MDRD: 71 ML/MIN/1.7M2
GLUCOSE SERPL-MCNC: 116 MG/DL (ref 70–99)
LDLC SERPL DIRECT ASSAY-MCNC: 75 MG/DL
MICROALBUMIN UR-MCNC: 39 MG/L
MICROALBUMIN/CREAT UR: 31.98 MG/G CR (ref 0–17)
POTASSIUM SERPL-SCNC: 4.1 MMOL/L (ref 3.4–5.3)
SODIUM SERPL-SCNC: 139 MMOL/L (ref 133–144)
T4 FREE SERPL-MCNC: 1.15 NG/DL (ref 0.76–1.46)
TSH SERPL DL<=0.005 MIU/L-ACNC: 0.39 MU/L (ref 0.4–4)

## 2018-04-23 DIAGNOSIS — E78.2 MIXED HYPERLIPIDEMIA: ICD-10-CM

## 2018-04-23 RX ORDER — LANCETS
EACH MISCELLANEOUS
Qty: 102 EACH | Refills: 3 | Status: SHIPPED | OUTPATIENT
Start: 2018-04-23 | End: 2019-02-25

## 2018-04-23 NOTE — TELEPHONE ENCOUNTER
"    Requested Prescriptions   Pending Prescriptions Disp Refills     blood glucose monitoring (ACCU-CHEK FASTCLIX) lancets [Pharmacy Med Name: ACCU-CHEK FASTCLIX LANCETS 102'S] 102 each 0     Sig: USE TO TEST BLOOD SUGAR ONE TIME DAILY OR AS DIRECTED    Diabetic Supplies Protocol Passed    4/23/2018  1:38 PM       Passed - Patient is 18 years of age or older       Passed - Recent (6 mo) or future (30 days) visit within the authorizing provider's specialty    Patient had office visit in the last 6 months or has a visit in the next 30 days with authorizing provider.  See \"Patient Info\" tab in inbasket, or \"Choose Columns\" in Meds & Orders section of the refill encounter.            Last Written Prescription Date:  1/18/18  Last Fill Quantity: 100,  # refills: 2   Last office visit: 4/12/2018 with prescribing provider:     Future Office Visit:      Refill approved through [facility] protocol.  Chasity Daniels RN  [facility]  [phone]    "

## 2018-07-01 ENCOUNTER — TRANSFERRED RECORDS (OUTPATIENT)
Dept: HEALTH INFORMATION MANAGEMENT | Facility: CLINIC | Age: 73
End: 2018-07-01

## 2018-09-27 ENCOUNTER — OFFICE VISIT (OUTPATIENT)
Dept: FAMILY MEDICINE | Facility: CLINIC | Age: 73
End: 2018-09-27
Payer: COMMERCIAL

## 2018-09-27 VITALS
DIASTOLIC BLOOD PRESSURE: 76 MMHG | RESPIRATION RATE: 14 BRPM | SYSTOLIC BLOOD PRESSURE: 160 MMHG | WEIGHT: 147 LBS | HEIGHT: 70 IN | OXYGEN SATURATION: 99 % | TEMPERATURE: 97.4 F | BODY MASS INDEX: 21.05 KG/M2 | HEART RATE: 63 BPM

## 2018-09-27 DIAGNOSIS — Z23 NEED FOR PROPHYLACTIC VACCINATION AND INOCULATION AGAINST INFLUENZA: Primary | ICD-10-CM

## 2018-09-27 DIAGNOSIS — E78.2 MIXED HYPERLIPIDEMIA: ICD-10-CM

## 2018-09-27 LAB
ANION GAP SERPL CALCULATED.3IONS-SCNC: 7 MMOL/L (ref 3–14)
BUN SERPL-MCNC: 25 MG/DL (ref 7–30)
CALCIUM SERPL-MCNC: 9.6 MG/DL (ref 8.5–10.1)
CHLORIDE SERPL-SCNC: 105 MMOL/L (ref 94–109)
CO2 SERPL-SCNC: 27 MMOL/L (ref 20–32)
CREAT SERPL-MCNC: 1 MG/DL (ref 0.66–1.25)
GFR SERPL CREATININE-BSD FRML MDRD: 74 ML/MIN/1.7M2
GLUCOSE SERPL-MCNC: 132 MG/DL (ref 70–99)
HBA1C MFR BLD: 6.5 % (ref 0–5.6)
POTASSIUM SERPL-SCNC: 4.8 MMOL/L (ref 3.4–5.3)
SODIUM SERPL-SCNC: 139 MMOL/L (ref 133–144)

## 2018-09-27 PROCEDURE — 99214 OFFICE O/P EST MOD 30 MIN: CPT | Mod: 25 | Performed by: FAMILY MEDICINE

## 2018-09-27 PROCEDURE — 36415 COLL VENOUS BLD VENIPUNCTURE: CPT | Performed by: FAMILY MEDICINE

## 2018-09-27 PROCEDURE — G0008 ADMIN INFLUENZA VIRUS VAC: HCPCS | Performed by: FAMILY MEDICINE

## 2018-09-27 PROCEDURE — 80048 BASIC METABOLIC PNL TOTAL CA: CPT | Performed by: FAMILY MEDICINE

## 2018-09-27 PROCEDURE — 90662 IIV NO PRSV INCREASED AG IM: CPT | Performed by: FAMILY MEDICINE

## 2018-09-27 PROCEDURE — 83036 HEMOGLOBIN GLYCOSYLATED A1C: CPT | Performed by: FAMILY MEDICINE

## 2018-09-27 RX ORDER — METFORMIN HYDROCHLORIDE 750 MG/1
TABLET, EXTENDED RELEASE ORAL
Qty: 90 TABLET | Refills: 1 | Status: SHIPPED | OUTPATIENT
Start: 2018-09-27 | End: 2019-01-16

## 2018-09-27 RX ORDER — SIMVASTATIN 20 MG
TABLET ORAL
Qty: 90 TABLET | Refills: 1 | Status: SHIPPED | OUTPATIENT
Start: 2018-09-27 | End: 2019-01-16

## 2018-09-27 RX ORDER — LISINOPRIL 2.5 MG/1
2.5 TABLET ORAL DAILY
Qty: 90 TABLET | Refills: 3 | Status: SHIPPED | OUTPATIENT
Start: 2018-09-27 | End: 2019-01-16

## 2018-09-27 NOTE — PROGRESS NOTES
SUBJECTIVE:   uYry Noriega is a 72 year old male who presents to clinic today for the following health issues:      Diabetes Follow-up    Patient is checking blood sugars: once daily.  Results are as follows:         am - 100/120    Diabetic concerns: None     Symptoms of hypoglycemia (low blood sugar): none     Paresthesias (numbness or burning in feet) or sores: No     Date of last diabetic eye exam: 7/1/18    BP Readings from Last 2 Encounters:   04/12/18 130/72   09/13/17 125/80     Hemoglobin A1C (%)   Date Value   04/12/2018 6.4   09/13/2017 6.5 (H)     LDL Cholesterol Calculated (mg/dL)   Date Value   03/14/2017 87     LDL Cholesterol Direct (mg/dL)   Date Value   04/12/2018 75   02/09/2016 59       Diabetes Management Resources    Amount of exercise or physical activity: 6-7 days/week for an average of 45-60 minutes    Problems taking medications regularly: No    Medication side effects: none    Diet: regular (no restrictions)    Problem list and histories reviewed & adjusted, as indicated.  Additional history: as documented    Patient Active Problem List   Diagnosis     BPH (benign prostatic hyperplasia)     CARDIOVASCULAR SCREENING; LDL GOAL LESS THAN 160     Type 2 diabetes mellitus, uncontrolled (H)     Advance Care Planning     Past Surgical History:   Procedure Laterality Date     AS CLOSED RX FEMUR SHAFT FX       COLONOSCOPY N/A 1/5/2016    Procedure: COLONOSCOPY;  Surgeon: Jose Luis Nunez MD;  Location:  GI       Social History   Substance Use Topics     Smoking status: Never Smoker     Smokeless tobacco: Never Used     Alcohol use Yes      Comment: rarely     Family History   Problem Relation Age of Onset     Diabetes Mother          Current Outpatient Prescriptions   Medication Sig Dispense Refill     ACCU-CHEK DIONICIO PLUS test strip TEST DAILY OR AS DIRECTED 100 strip 2     blood glucose monitoring (ACCU-CHEK DIONICIO PLUS) meter device kit Use to test blood sugars 1 times daily or as  directed. 1 kit 0     blood glucose monitoring (ACCU-CHEK FASTCLIX) lancets USE TO TEST BLOOD SUGAR ONE TIME DAILY OR AS DIRECTED 102 each 3     lisinopril (PRINIVIL/ZESTRIL) 2.5 MG tablet Take 1 tablet (2.5 mg) by mouth daily 90 tablet 3     metFORMIN (GLUCOPHAGE-XR) 750 MG 24 hr tablet TAKE 1 TABLET BY MOUTH DAILY WITH DINNER 90 tablet 1     simvastatin (ZOCOR) 20 MG tablet TAKE 1 TABLET(20 MG) BY MOUTH AT BEDTIME 90 tablet 1     [DISCONTINUED] lisinopril (PRINIVIL/ZESTRIL) 2.5 MG tablet TAKE 1 TABLET BY MOUTH DAILY 90 tablet 3     [DISCONTINUED] metFORMIN (GLUCOPHAGE-XR) 750 MG 24 hr tablet TAKE 1 TABLET BY MOUTH DAILY WITH DINNER 90 tablet 1     [DISCONTINUED] simvastatin (ZOCOR) 20 MG tablet TAKE 1 TABLET(20 MG) BY MOUTH AT BEDTIME 90 tablet 1     No Known Allergies  Recent Labs   Lab Test  04/12/18   1109  09/13/17   1359  03/14/17   1024   02/09/16   1206  12/01/15   0903   A1C  6.4  6.5*  6.8*   < >  6.6*  8.0*   LDL  75   --   87   --   59   --    HDL   --    --   55   --    --    --    TRIG   --    --   79   --    --   78   ALT  14  17 22   --    --    --    CR  1.03  1.05  0.90   < >   --   1.00   GFRESTIMATED  71  70  84   < >   --   74   GFRESTBLACK  86  85  >90   GFR Calc     < >   --   90   POTASSIUM  4.1  4.2  3.7   < >   --   3.9   TSH  0.39*   --    --    --    --    --     < > = values in this interval not displayed.      BP Readings from Last 3 Encounters:   09/27/18 160/76   04/12/18 130/72   09/13/17 125/80    Wt Readings from Last 3 Encounters:   09/27/18 147 lb (66.7 kg)   04/12/18 146 lb (66.2 kg)   09/13/17 145 lb (65.8 kg)                    Reviewed and updated as needed this visit by clinical staff       Reviewed and updated as needed this visit by Provider         ROS:  Constitutional, HEENT, cardiovascular, pulmonary, gi and gu systems are negative, except as otherwise noted.    OBJECTIVE:     /76 (Cuff Size: Adult Regular)  Pulse 63  Temp 97.4  F (36.3  C)  "(Tympanic)  Resp 14  Ht 5' 10.25\" (1.784 m)  Wt 147 lb (66.7 kg)  SpO2 99%  BMI 20.94 kg/m2  Body mass index is 20.94 kg/(m^2).  GENERAL: healthy, alert and no distress  EYES: Eyes grossly normal to inspection, PERRL and conjunctivae and sclerae normal  HENT: ear canals and TM's normal, nose and mouth without ulcers or lesions  NECK: no adenopathy, no asymmetry, masses, or scars and thyroid normal to palpation  RESP: lungs clear to auscultation - no rales, rhonchi or wheezes  CV: regular rate and rhythm, normal S1 S2, no S3 or S4, no murmur, click or rub, no peripheral edema and peripheral pulses strong  ABDOMEN: soft, nontender, no hepatosplenomegaly, no masses and bowel sounds normal  MS: no gross musculoskeletal defects noted, no edema  SKIN: no suspicious lesions or rashes  NEURO: Normal strength and tone, mentation intact and speech normal  BACK: no CVA tenderness, no paralumbar tenderness  PSYCH: mentation appears normal, affect normal/bright  LYMPH: no cervical, supraclavicular, axillary, or inguinal adenopathy        ASSESSMENT/PLAN:   ASSESSMENT / PLAN:  (Z23) Need for prophylactic vaccination and inoculation against influenza  (primary encounter diagnosis)  Plan: FLU VACCINE, INCREASED ANTIGEN, PRESV FREE, AGE        65+ [37170], Vaccine Administration, Initial         [08900]            (E11.65) Uncontrolled diabetes mellitus type 2 without complications, unspecified long term insulin use status (H)  Comment: has been stable with current dose of meds, has no side effect from the meds, will keep watching sx   Plan: lisinopril (PRINIVIL/ZESTRIL) 2.5 MG tablet,         simvastatin (ZOCOR) 20 MG tablet, Hemoglobin         A1c, Basic metabolic panel  (Ca, Cl, CO2,         Creat, Gluc, K, Na, BUN), metFORMIN         (GLUCOPHAGE-XR) 750 MG 24 hr tablet            (E78.2) Mixed hyperlipidemia  Comment: stable   Plan: simvastatin (ZOCOR) 20 MG tablet, Hemoglobin         A1c, Basic metabolic panel  (Ca, Cl, " CO2,         Creat, Gluc, K, Na, BUN), metFORMIN         (GLUCOPHAGE-XR) 750 MG 24 hr tablet                FUTURE APPOINTMENTS:       - Follow-up visit in 6 months     Zhou Emmanuel MD  [facility]      Injectable Influenza Immunization Documentation    1.  Is the person to be vaccinated sick today?   No    2. Does the person to be vaccinated have an allergy to a component   of the vaccine?   No  Egg Allergy Algorithm Link    3. Has the person to be vaccinated ever had a serious reaction   to influenza vaccine in the past?   No    4. Has the person to be vaccinated ever had Guillain-Barré syndrome?   No    Form completed by Meri Bustillo, [facility]

## 2018-09-27 NOTE — MR AVS SNAPSHOT
After Visit Summary   9/27/2018    Yury Noriega    MRN: [id number]           Patient Information     Date Of Birth          1945        Visit Information        Provider Department      9/27/2018 10:00 AM Zhou Emmanuel MD [facility] [place] [facility]        Today's Diagnoses     Need for prophylactic vaccination and inoculation against influenza    -  1    Uncontrolled diabetes mellitus type 2 without complications, unspecified long term insulin use status (H)        Mixed hyperlipidemia           Follow-ups after your visit        Follow-up notes from your care team     Return in about 6 months (around 3/27/2019) for Physical Exam, BP Recheck, DM recheck, Routine Visit, Lab Work.      Who to contact     If you have questions or need follow up information about today's clinic visit or your schedule please contact [facility] [place] directly at [phone].  Normal or non-critical lab and imaging results will be communicated to you by [employer], letter or phone within 4 business days after the clinic has received the results. If you do not hear from us within 7 days, please contact the clinic through [employer]Like[employer] or phone. If you have a critical or abnormal lab result, we will notify you by phone as soon as possible.  Submit refill requests through [employer] or call your pharmacy and they will forward the refill request to us. Please allow 3 business days for your refill to be completed.          Additional Information About Your Visit        MyChart Information     [employer] gives you secure access to your electronic health record. If you see a primary care provider, you can also send messages to your care team and make appointments. If you have questions, please call your primary care clinic.  If you do not have a primary care provider, please call [phone] and they will assist you.        Care EveryWhere ID     This is your Care EveryWhere ID. This could be used by other  "organizations to access your [facility] medical records  [id number]        Your Vitals Were     Pulse Temperature Respirations Height Pulse Oximetry BMI (Body Mass Index)    63 97.4  F (36.3  C) (Tympanic) 14 5' 10.25\" (1.784 m) 99% 20.94 kg/m2       Blood Pressure from Last 3 Encounters:   09/27/18 160/76   04/12/18 130/72   09/13/17 125/80    Weight from Last 3 Encounters:   09/27/18 147 lb (66.7 kg)   04/12/18 146 lb (66.2 kg)   09/13/17 145 lb (65.8 kg)              We Performed the Following     Basic metabolic panel  (Ca, Cl, CO2, Creat, Gluc, K, Na, BUN)     FLU VACCINE, INCREASED ANTIGEN, PRESV FREE, AGE 65+ [32680]     Hemoglobin A1c     Vaccine Administration, Initial [94640]          Today's Medication Changes          These changes are accurate as of 9/27/18 10:32 AM.  If you have any questions, ask your nurse or doctor.               These medicines have changed or have updated prescriptions.        Dose/Directions    lisinopril 2.5 MG tablet   Commonly known as:  PRINIVIL/Zestril   This may have changed:  See the new instructions.   Used for:  Uncontrolled diabetes mellitus type 2 without complications, unspecified long term insulin use status (H)   Changed by:  Zhou Emmanuel MD        Dose:  2.5 mg   Take 1 tablet (2.5 mg) by mouth daily   Quantity:  90 tablet   Refills:  3            Where to get your medicines      [facility] [address] [place] [address] [place]  [place] [address] [address] AT [facility]  [address] [address]     Phone:  [phone]     lisinopril 2.5 MG tablet    metFORMIN 750 MG 24 hr tablet    simvastatin 20 MG tablet                Primary Care Provider Office Phone # Fax #    Zhou Emmanuel [phone]       [address] [facility] [address]        Equal Access to Services     [facility] AH: Hadii mercedes Hidalgo, awilda wolf, qaybta juana dominguez" ah. So [facility] [phone].    ATENCIÓN: Si shunla luc, tiene a warner disposición servicios gratuitos de asistencia lingüística. Randee al [phone].    We comply with applicable federal civil rights laws and [place] laws. We do not discriminate on the basis of race, color, national origin, age, disability, sex, sexual orientation, or gender identity.            Thank you!     Thank you for choosing [facility] [place]  for your care. Our goal is always to provide you with excellent care. Hearing back from our patients is one way we can continue to improve our services. Please take a few minutes to complete the written survey that you may receive in the mail after your visit with us. Thank you!             Your Updated Medication List - Protect others around you: Learn how to safely use, store and throw away your medicines at www.disposemymeds.org.          This list is accurate as of 9/27/18 10:32 AM.  Always use your most recent med list.                   Brand Name Dispense Instructions for use Diagnosis    ACCU-CHEK DIONICIO PLUS test strip   Generic drug:  blood glucose monitoring     100 strip    TEST DAILY OR AS DIRECTED    Type 2 diabetes mellitus without complication, unspecified long term insulin use status (H)       blood glucose monitoring lancets     102 each    USE TO TEST BLOOD SUGAR ONE TIME DAILY OR AS DIRECTED    Uncontrolled diabetes mellitus type 2 without complications, unspecified long term insulin use status (H), Mixed hyperlipidemia       blood glucose monitoring meter device kit     1 kit    Use to test blood sugars 1 times daily or as directed.    Type 2 diabetes mellitus, uncontrolled (H)       lisinopril 2.5 MG tablet    PRINIVIL/Zestril    90 tablet    Take 1 tablet (2.5 mg) by mouth daily    Uncontrolled diabetes mellitus type 2 without complications, unspecified long term insulin use status (H)       metFORMIN 750 MG 24 hr tablet    GLUCOPHAGE-XR    90 tablet    TAKE 1 TABLET  BY MOUTH DAILY WITH DINNER    Uncontrolled diabetes mellitus type 2 without complications, unspecified long term insulin use status (H), Mixed hyperlipidemia       simvastatin 20 MG tablet    ZOCOR    90 tablet    TAKE 1 TABLET(20 MG) BY MOUTH AT BEDTIME    Uncontrolled diabetes mellitus type 2 without complications, unspecified long term insulin use status (H), Mixed hyperlipidemia

## 2018-11-07 DIAGNOSIS — E11.9 TYPE 2 DIABETES MELLITUS WITHOUT COMPLICATION (H): ICD-10-CM

## 2018-11-07 DIAGNOSIS — E11.9 TYPE 2 DIABETES MELLITUS WITHOUT COMPLICATION, UNSPECIFIED WHETHER LONG TERM INSULIN USE (H): Primary | ICD-10-CM

## 2018-11-07 RX ORDER — BLOOD SUGAR DIAGNOSTIC
STRIP MISCELLANEOUS
Qty: 100 STRIP | Refills: 0 | Status: SHIPPED | OUTPATIENT
Start: 2018-11-07 | End: 2019-02-25

## 2018-11-07 NOTE — TELEPHONE ENCOUNTER
Routing refill request to provider for review/approval because:  Patient needs to be seen because:  Due for DM check    Chasity Daniels,RN BSN  [facility]  [phone]

## 2018-11-07 NOTE — TELEPHONE ENCOUNTER
"Requested Prescriptions   Pending Prescriptions Disp Refills     ACCU-CHEK DIONICIO PLUS test strip [Pharmacy Med Name: ACCU-CHEK DIONICIO PLUS STRIPS 100'S] 100 strip 0     Sig: TEST DAILY OR AS DIRECTED    Diabetic Supplies Protocol Passed    11/7/2018  9:13 AM       Passed - Patient is 18 years of age or older       Passed - Recent (6 mo) or future (30 days) visit within the authorizing provider's specialty    Patient had office visit in the last 6 months or has a visit in the next 30 days with authorizing provider.  See \"Patient Info\" tab in inbasket, or \"Choose Columns\" in Meds & Orders section of the refill encounter.            ACCU-CHEK DIONICIO PLUS test strip 100 strip 2 1/18/2018       Last Written Prescription Date:  01/18/2018  Last Fill Quantity: 100,  # refills: 2   Last office visit: 9/27/2018 with prescribing provider:  Dr. Emmanuel   Future Office Visit:  Unknown     "

## 2019-01-16 ENCOUNTER — OFFICE VISIT (OUTPATIENT)
Dept: FAMILY MEDICINE | Facility: CLINIC | Age: 74
End: 2019-01-16
Payer: MEDICARE

## 2019-01-16 VITALS
SYSTOLIC BLOOD PRESSURE: 135 MMHG | TEMPERATURE: 96.8 F | WEIGHT: 144 LBS | HEIGHT: 70 IN | DIASTOLIC BLOOD PRESSURE: 70 MMHG | RESPIRATION RATE: 12 BRPM | HEART RATE: 68 BPM | BODY MASS INDEX: 20.62 KG/M2 | OXYGEN SATURATION: 100 %

## 2019-01-16 DIAGNOSIS — E78.2 MIXED HYPERLIPIDEMIA: ICD-10-CM

## 2019-01-16 DIAGNOSIS — E11.649 UNCONTROLLED TYPE 2 DIABETES MELLITUS WITH HYPOGLYCEMIA WITHOUT COMA (H): Primary | ICD-10-CM

## 2019-01-16 DIAGNOSIS — E11.9 WELL CONTROLLED TYPE 2 DIABETES MELLITUS (H): ICD-10-CM

## 2019-01-16 DIAGNOSIS — Z13.6 CARDIOVASCULAR SCREENING; LDL GOAL LESS THAN 160: ICD-10-CM

## 2019-01-16 LAB — HBA1C MFR BLD: 6.6 % (ref 0–5.6)

## 2019-01-16 PROCEDURE — 99214 OFFICE O/P EST MOD 30 MIN: CPT | Performed by: FAMILY MEDICINE

## 2019-01-16 PROCEDURE — 83721 ASSAY OF BLOOD LIPOPROTEIN: CPT | Performed by: FAMILY MEDICINE

## 2019-01-16 PROCEDURE — 84460 ALANINE AMINO (ALT) (SGPT): CPT | Performed by: FAMILY MEDICINE

## 2019-01-16 PROCEDURE — 80048 BASIC METABOLIC PNL TOTAL CA: CPT | Performed by: FAMILY MEDICINE

## 2019-01-16 PROCEDURE — 83036 HEMOGLOBIN GLYCOSYLATED A1C: CPT | Performed by: FAMILY MEDICINE

## 2019-01-16 PROCEDURE — 36415 COLL VENOUS BLD VENIPUNCTURE: CPT | Performed by: FAMILY MEDICINE

## 2019-01-16 RX ORDER — METFORMIN HYDROCHLORIDE 750 MG/1
TABLET, EXTENDED RELEASE ORAL
Qty: 30 TABLET | Refills: 6 | Status: SHIPPED | OUTPATIENT
Start: 2019-01-16 | End: 2019-06-14

## 2019-01-16 RX ORDER — LISINOPRIL 2.5 MG/1
2.5 TABLET ORAL DAILY
Qty: 90 TABLET | Refills: 3 | Status: SHIPPED | OUTPATIENT
Start: 2019-01-16 | End: 2020-01-07

## 2019-01-16 RX ORDER — SIMVASTATIN 20 MG
TABLET ORAL
Qty: 60 TABLET | Refills: 6 | Status: SHIPPED | OUTPATIENT
Start: 2019-01-16 | End: 2019-08-16

## 2019-01-16 ASSESSMENT — MIFFLIN-ST. JEOR: SCORE: 1418.4

## 2019-01-16 NOTE — PROGRESS NOTES
SUBJECTIVE:   Yury Noriega is a 73 year old male who presents to clinic today for the following health issues:      Diabetes Follow-up    Patient is checking blood sugars: once daily.  Results are as follows:         am - 105-120    Diabetic concerns: None     Symptoms of hypoglycemia (low blood sugar): none     Paresthesias (numbness or burning in feet) or sores: No     Date of last diabetic eye exam: 7/1/18    BP Readings from Last 2 Encounters:   09/27/18 160/76   04/12/18 130/72     Hemoglobin A1C (%)   Date Value   09/27/2018 6.5 (H)   04/12/2018 6.4     LDL Cholesterol Calculated (mg/dL)   Date Value   03/14/2017 87     LDL Cholesterol Direct (mg/dL)   Date Value   04/12/2018 75   02/09/2016 59       Diabetes Management Resources    Amount of exercise or physical activity: 6-7 days/week for an average of 15-30 minutes    Problems taking medications regularly: No    Medication side effects: none    Diet: regular (no restrictions)      Problem list and histories reviewed & adjusted, as indicated.  Additional history: as documented    Patient Active Problem List   Diagnosis     BPH (benign prostatic hyperplasia)     CARDIOVASCULAR SCREENING; LDL GOAL LESS THAN 160     Type 2 diabetes mellitus, uncontrolled (H)     Advance Care Planning     Past Surgical History:   Procedure Laterality Date     AS CLOSED RX FEMUR SHAFT FX       COLONOSCOPY N/A 1/5/2016    Procedure: COLONOSCOPY;  Surgeon: Jose Luis Nunez MD;  Location:  GI       Social History     Tobacco Use     Smoking status: Never Smoker     Smokeless tobacco: Never Used   Substance Use Topics     Alcohol use: Yes     Comment: rarely     Family History   Problem Relation Age of Onset     Diabetes Mother          Current Outpatient Medications   Medication Sig Dispense Refill     ACCU-CHEK DIONICIO PLUS test strip TEST DAILY OR AS DIRECTED 100 strip 0     blood glucose monitoring (ACCU-CHEK DIONICIO PLUS) meter device kit Use to test blood sugars 1 times  "daily or as directed. 1 kit 0     blood glucose monitoring (ACCU-CHEK FASTCLIX) lancets USE TO TEST BLOOD SUGAR ONE TIME DAILY OR AS DIRECTED 102 each 3     lisinopril (PRINIVIL/ZESTRIL) 2.5 MG tablet Take 1 tablet (2.5 mg) by mouth daily 90 tablet 3     metFORMIN (GLUCOPHAGE-XR) 750 MG 24 hr tablet TAKE 1 TABLET BY MOUTH DAILY WITH DINNER 30 tablet 6     simvastatin (ZOCOR) 20 MG tablet TAKE 1 TABLET(20 MG) BY MOUTH AT BEDTIME 60 tablet 6     No Known Allergies  Recent Labs   Lab Test 09/27/18  1020 04/12/18  1109 09/13/17  1359 03/14/17  1024  02/09/16  1206 12/01/15  0903   A1C 6.5* 6.4 6.5* 6.8*   < > 6.6* 8.0*   LDL  --  75  --  87  --  59  --    HDL  --   --   --  55  --   --   --    TRIG  --   --   --  79  --   --  78   ALT  --  14 17 22  --   --   --    CR 1.00 1.03 1.05 0.90   < >  --  1.00   GFRESTIMATED 74 71 70 84   < >  --  74   GFRESTBLACK 89 86 85 >90   GFR Calc     < >  --  90   POTASSIUM 4.8 4.1 4.2 3.7   < >  --  3.9   TSH  --  0.39*  --   --   --   --   --     < > = values in this interval not displayed.      BP Readings from Last 3 Encounters:   01/16/19 135/70   09/27/18 160/76   04/12/18 130/72    Wt Readings from Last 3 Encounters:   01/16/19 65.3 kg (144 lb)   09/27/18 66.7 kg (147 lb)   04/12/18 66.2 kg (146 lb)                    Reviewed and updated as needed this visit by clinical staff       Reviewed and updated as needed this visit by Provider         ROS:  Constitutional, HEENT, cardiovascular, pulmonary, gi and gu systems are negative, except as otherwise noted.    OBJECTIVE:     /70 (Cuff Size: Adult Regular)   Pulse 68   Temp 96.8  F (36  C) (Tympanic)   Resp 12   Ht 1.784 m (5' 10.25\")   Wt 65.3 kg (144 lb)   SpO2 100%   BMI 20.52 kg/m    Body mass index is 20.52 kg/m .  GENERAL: healthy, alert and no distress  EYES: Eyes grossly normal to inspection, PERRL and conjunctivae and sclerae normal  HENT: ear canals and TM's normal, nose and mouth without " ulcers or lesions  NECK: no adenopathy, no asymmetry, masses, or scars and thyroid normal to palpation  RESP: lungs clear to auscultation - no rales, rhonchi or wheezes  CV: regular rate and rhythm, normal S1 S2, no S3 or S4, no murmur, click or rub, no peripheral edema and peripheral pulses strong  ABDOMEN: soft, nontender, no hepatosplenomegaly, no masses and bowel sounds normal  MS: no gross musculoskeletal defects noted, no edema  SKIN: no suspicious lesions or rashes  NEURO: Normal strength and tone, mentation intact and speech normal  BACK: no CVA tenderness, no paralumbar tenderness  PSYCH: mentation appears normal, affect normal/bright  LYMPH: no cervical, supraclavicular, axillary, or inguinal adenopathy        ASSESSMENT/PLAN:   ASSESSMENT / PLAN:  (E11.649) Uncontrolled type 2 diabetes mellitus with hypoglycemia without coma (H)  (primary encounter diagnosis)  Comment: improving, will have him to recheck lab for further evaluation   Plan: Hemoglobin A1c, Albumin Random Urine         Quantitative with Creat Ratio, Basic metabolic         panel  (Ca, Cl, CO2, Creat, Gluc, K, Na, BUN),         ALT, LDL cholesterol direct            (Z13.6) CARDIOVASCULAR SCREENING; LDL GOAL LESS THAN 160  Comment: stable   Plan: keep monitoring     (E11.9) Well controlled type 2 diabetes mellitus (H)  Comment: mentioned above   Plan: metFORMIN (GLUCOPHAGE-XR) 750 MG 24 hr tablet,         lisinopril (PRINIVIL/ZESTRIL) 2.5 MG tablet,         simvastatin (ZOCOR) 20 MG tablet            (E78.2) Mixed hyperlipidemia  Comment: stable  Plan: metFORMIN (GLUCOPHAGE-XR) 750 MG 24 hr tablet,         simvastatin (ZOCOR) 20 MG tablet        Will keep monitoring         FUTURE APPOINTMENTS:       - Follow-up visit in 6 months     Zhou Emmanuel MD  [facility]

## 2019-01-17 LAB
ALT SERPL W P-5'-P-CCNC: 15 U/L (ref 0–70)
ANION GAP SERPL CALCULATED.3IONS-SCNC: 2 MMOL/L (ref 3–14)
BUN SERPL-MCNC: 28 MG/DL (ref 7–30)
CALCIUM SERPL-MCNC: 9.4 MG/DL (ref 8.5–10.1)
CHLORIDE SERPL-SCNC: 107 MMOL/L (ref 94–109)
CO2 SERPL-SCNC: 29 MMOL/L (ref 20–32)
CREAT SERPL-MCNC: 0.95 MG/DL (ref 0.66–1.25)
GFR SERPL CREATININE-BSD FRML MDRD: 80 ML/MIN/{1.73_M2}
GLUCOSE SERPL-MCNC: 129 MG/DL (ref 70–99)
LDLC SERPL DIRECT ASSAY-MCNC: 75 MG/DL
POTASSIUM SERPL-SCNC: 5.1 MMOL/L (ref 3.4–5.3)
SODIUM SERPL-SCNC: 138 MMOL/L (ref 133–144)

## 2019-02-25 DIAGNOSIS — E78.2 MIXED HYPERLIPIDEMIA: ICD-10-CM

## 2019-02-25 DIAGNOSIS — E11.9 TYPE 2 DIABETES MELLITUS WITHOUT COMPLICATION, UNSPECIFIED WHETHER LONG TERM INSULIN USE (H): ICD-10-CM

## 2019-02-25 RX ORDER — LANCETS
EACH MISCELLANEOUS
Qty: 102 EACH | Refills: 1 | Status: SHIPPED | OUTPATIENT
Start: 2019-02-25 | End: 2019-09-18

## 2019-02-25 NOTE — TELEPHONE ENCOUNTER
"blood glucose (ACCU-CHEK DIONICIO PLUS) test strip    Last Written Prescription Date:  11/7/2018  Last Fill Quantity: 100strip ,  # refills: 0   Last office visit: 1/16/2019 with prescribing provider:  Yes    Future Office Visit:      Requested Prescriptions   Pending Prescriptions Disp Refills     blood glucose (ACCU-CHEK DIONICIO PLUS) test strip 100 strip 0     Sig: TEST DAILY OR AS DIRECTED    Diabetic Supplies Protocol Passed - 2/25/2019 10:29 AM       Passed - Medication is active on med list       Passed - Patient is 18 years of age or older       Passed - Recent (6 mo) or future (30 days) visit within the authorizing provider's specialty    Patient had office visit in the last 6 months or has a visit in the next 30 days with authorizing provider.  See \"Patient Info\" tab in inbasket, or \"Choose Columns\" in Meds & Orders section of the refill encounter.           blood glucose monitoring (ACCU-CHEK FASTCLIX) lancets    Last Written Prescription Date:  4/23/2018  Last Fill Quantity: 102 each,  # refills: 3   Last office visit: 1/16/2019 with prescribing provider:  Yes    Future Office Visit:           blood glucose monitoring (ACCU-CHEK FASTCLIX) lancets 102 each 3     Sig: USE TO TEST BLOOD SUGAR ONE TIME DAILY OR AS DIRECTED    Diabetic Supplies Protocol Passed - 2/25/2019 10:29 AM       Passed - Medication is active on med list       Passed - Patient is 18 years of age or older       Passed - Recent (6 mo) or future (30 days) visit within the authorizing provider's specialty    Patient had office visit in the last 6 months or has a visit in the next 30 days with authorizing provider.  See \"Patient Info\" tab in inbasket, or \"Choose Columns\" in Meds & Orders section of the refill encounter.            Patient is requesting prescriptions be sent to new pharmacy: [facility] [place].      Prescription approved per [facility] Refill Protocol.  Melinda ESTRADAN, RN   [facility]       "

## 2019-06-13 DIAGNOSIS — E78.2 MIXED HYPERLIPIDEMIA: ICD-10-CM

## 2019-06-13 DIAGNOSIS — E11.9 WELL CONTROLLED TYPE 2 DIABETES MELLITUS (H): ICD-10-CM

## 2019-06-13 NOTE — LETTER
June 17, 2019      Yury Noriega  [address] [place] [address]        Dear Yury,      We have tried to reach you multiple times regarding a refill request. Your chart indicates that you are due for an office visit and in order for us to continue refilling your medication you will need to schedule an appointment with a provider.       Here is a list of Health Maintenance topics that are due now or due soon:  Health Maintenance Due   Topic Date Due     Zoster (Shingles) Vaccine (1 of 2) 11/15/1997     Annual Wellness Visit  11/15/2012     AORTIC ANEURYSM SCREENING (SYSTEM ASSIGNED)  11/15/2012     Cholesterol Lab  03/14/2018     Kidney Function Urine Test  04/12/2019     Diabetic Foot Exam  04/12/2019     Eye Exam  07/01/2019       Please call us at [phone] (or use [employer]) to address the above recommendations.     Thank you for trusting [facility]] and we appreciate the opportunity to serve you.  We look forward to supporting your healthcare needs in the future.    Healthy Regards,    [facility]

## 2019-06-13 NOTE — TELEPHONE ENCOUNTER
"Requested Prescriptions   Pending Prescriptions Disp Refills     metFORMIN (GLUCOPHAGE-XR) 750 MG 24 hr tablet  Last Written Prescription Date:  1-  Last Fill Quantity: 30 tablet,  # refills: 6   Last office visit: 1/16/2019 with prescribing provider:     Future Office Visit:     30 tablet 6     Sig: TAKE 1 TABLET BY MOUTH DAILY WITH DINNER       Biguanide Agents Passed - 6/13/2019  4:46 PM        Passed - Blood pressure less than 140/90 in past 6 months     BP Readings from Last 3 Encounters:   01/16/19 135/70   09/27/18 160/76   04/12/18 130/72                 Passed - Patient has documented LDL within the past 12 mos.     Recent Labs   Lab Test 01/16/19  1054   LDL 75             Passed - Patient has had a Microalbumin in the past 15 mos.     Recent Labs   Lab Test 04/12/18  1113   MICROL 39   UMALCR 31.98*             Passed - Patient is age 10 or older        Passed - Patient has documented A1c within the specified period of time.     If HgbA1C is 8 or greater, it needs to be on file within the past 3 months.  If less than 8, must be on file within the past 6 months.     Recent Labs   Lab Test 01/16/19  1054   A1C 6.6*             Passed - Patient's CR is NOT>1.4 OR Patient's EGFR is NOT<45 within past 12 mos.     Recent Labs   Lab Test 01/16/19  1054   GFRESTIMATED 80   GFRESTBLACK >90       Recent Labs   Lab Test 01/16/19  1054   CR 0.95             Passed - Patient does NOT have a diagnosis of CHF.        Passed - Medication is active on med list        Passed - Recent (6 mo) or future (30 days) visit within the authorizing provider's specialty     Patient had office visit in the last 6 months or has a visit in the next 30 days with authorizing provider or within the authorizing provider's specialty.  See \"Patient Info\" tab in inbasket, or \"Choose Columns\" in Meds & Orders section of the refill encounter.              "

## 2019-06-14 RX ORDER — METFORMIN HYDROCHLORIDE 750 MG/1
TABLET, EXTENDED RELEASE ORAL
Qty: 30 TABLET | Refills: 0 | Status: SHIPPED | OUTPATIENT
Start: 2019-06-14 | End: 2019-07-12

## 2019-06-14 NOTE — TELEPHONE ENCOUNTER
A 30 day supply is given (pharmacy change), patient is due for an office visit.  Please call to  assist the patient in scheduling an appointment.  SERVANDO Mccormick, RN  [facility]

## 2019-06-19 ENCOUNTER — TRANSFERRED RECORDS (OUTPATIENT)
Dept: HEALTH INFORMATION MANAGEMENT | Facility: CLINIC | Age: 74
End: 2019-06-19

## 2019-06-21 ENCOUNTER — TELEPHONE (OUTPATIENT)
Dept: FAMILY MEDICINE | Facility: CLINIC | Age: 74
End: 2019-06-21

## 2019-06-21 NOTE — TELEPHONE ENCOUNTER
Please abstract the following data from this visit with this patient into the appropriate field in Epic:    Eye exam with ophthalmology on this date: 6/19/19.

## 2019-07-12 DIAGNOSIS — E78.2 MIXED HYPERLIPIDEMIA: ICD-10-CM

## 2019-07-12 DIAGNOSIS — E11.9 WELL CONTROLLED TYPE 2 DIABETES MELLITUS (H): ICD-10-CM

## 2019-07-12 RX ORDER — METFORMIN HYDROCHLORIDE 750 MG/1
TABLET, EXTENDED RELEASE ORAL
Qty: 30 TABLET | Refills: 0 | Status: SHIPPED | OUTPATIENT
Start: 2019-07-12 | End: 2019-08-16

## 2019-07-12 NOTE — TELEPHONE ENCOUNTER
Routing refill request to provider for review/approval because:  Nicole given x1 and patient did not follow up, please advise  Patient needs to be seen because:  Patient has not been seen for DM since 1/2019.    NATALIE MccormickN, RN  [facility]

## 2019-07-12 NOTE — TELEPHONE ENCOUNTER
"Requested Prescriptions   Pending Prescriptions Disp Refills     metFORMIN (GLUCOPHAGE-XR) 750 MG 24 hr tablet 30 tablet 0     Sig: TAKE 1 TABLET BY MOUTH DAILY WITH DINNER       Biguanide Agents Failed - 7/12/2019  8:57 AM        Failed - Patient has had a Microalbumin in the past 15 mos.     Recent Labs   Lab Test 04/12/18  1113   MICROL 39   UMALCR 31.98*             Passed - Blood pressure less than 140/90 in past 6 months     BP Readings from Last 3 Encounters:   01/16/19 135/70   09/27/18 160/76   04/12/18 130/72                 Passed - Patient has documented LDL within the past 12 mos.     Recent Labs   Lab Test 01/16/19  1054   LDL 75             Passed - Patient is age 10 or older        Passed - Patient has documented A1c within the specified period of time.     If HgbA1C is 8 or greater, it needs to be on file within the past 3 months.  If less than 8, must be on file within the past 6 months.     Recent Labs   Lab Test 01/16/19  1054   A1C 6.6*             Passed - Patient's CR is NOT>1.4 OR Patient's EGFR is NOT<45 within past 12 mos.     Recent Labs   Lab Test 01/16/19  1054   GFRESTIMATED 80   GFRESTBLACK >90       Recent Labs   Lab Test 01/16/19  1054   CR 0.95             Passed - Patient does NOT have a diagnosis of CHF.        Passed - Medication is active on med list        Passed - Recent (6 mo) or future (30 days) visit within the authorizing provider's specialty     Patient had office visit in the last 6 months or has a visit in the next 30 days with authorizing provider or within the authorizing provider's specialty.  See \"Patient Info\" tab in inbasket, or \"Choose Columns\" in Meds & Orders section of the refill encounter.            Last Written Prescription Date:  6/14/2019  Last Fill Quantity: 30,  # refills: 0   Last office visit: 1/16/2019 with prescribing provider:  1/16/2019   Future Office Visit:      "

## 2019-08-16 ENCOUNTER — OFFICE VISIT (OUTPATIENT)
Dept: FAMILY MEDICINE | Facility: CLINIC | Age: 74
End: 2019-08-16
Payer: MEDICARE

## 2019-08-16 VITALS
HEIGHT: 70 IN | TEMPERATURE: 98.3 F | WEIGHT: 144 LBS | SYSTOLIC BLOOD PRESSURE: 136 MMHG | BODY MASS INDEX: 20.62 KG/M2 | DIASTOLIC BLOOD PRESSURE: 78 MMHG | OXYGEN SATURATION: 96 % | HEART RATE: 82 BPM

## 2019-08-16 DIAGNOSIS — E11.9 WELL CONTROLLED TYPE 2 DIABETES MELLITUS (H): ICD-10-CM

## 2019-08-16 DIAGNOSIS — E78.2 MIXED HYPERLIPIDEMIA: ICD-10-CM

## 2019-08-16 LAB — HBA1C MFR BLD: 6.5 % (ref 0–5.6)

## 2019-08-16 PROCEDURE — 36415 COLL VENOUS BLD VENIPUNCTURE: CPT | Performed by: FAMILY MEDICINE

## 2019-08-16 PROCEDURE — 99214 OFFICE O/P EST MOD 30 MIN: CPT | Performed by: FAMILY MEDICINE

## 2019-08-16 PROCEDURE — 82043 UR ALBUMIN QUANTITATIVE: CPT | Performed by: FAMILY MEDICINE

## 2019-08-16 PROCEDURE — 83036 HEMOGLOBIN GLYCOSYLATED A1C: CPT | Performed by: FAMILY MEDICINE

## 2019-08-16 PROCEDURE — 99207 C FOOT EXAM  NO CHARGE: CPT | Performed by: FAMILY MEDICINE

## 2019-08-16 RX ORDER — SIMVASTATIN 20 MG
TABLET ORAL
Qty: 90 TABLET | Refills: 1 | Status: SHIPPED | OUTPATIENT
Start: 2019-08-16 | End: 2020-03-23

## 2019-08-16 RX ORDER — METFORMIN HYDROCHLORIDE 750 MG/1
TABLET, EXTENDED RELEASE ORAL
Qty: 90 TABLET | Refills: 1 | Status: SHIPPED | OUTPATIENT
Start: 2019-08-16 | End: 2020-02-27

## 2019-08-16 ASSESSMENT — MIFFLIN-ST. JEOR: SCORE: 1418.4

## 2019-08-16 NOTE — PROGRESS NOTES
Subjective     Yury Noriega is a 73 year old male who presents to clinic today for the following health issues:    HPI   Diabetes Follow-up  On metformin 750 mg XR    How often are you checking your blood sugar? One time daily in am    What time of day are you checking your blood sugars (select all that apply)?  Before meals    Have you had any blood sugars above 200?  No    Have you had any blood sugars below 70?  No    What symptoms do you notice when your blood sugar is low?  None    What concerns do you have today about your diabetes? None     Do you have any of these symptoms? (Select all that apply)  No numbness or tingling in feet.  No redness, sores or blisters on feet.  No complaints of excessive thirst.  No reports of blurry vision.  No significant changes to weight.     Have you had a diabetic eye exam in the last 12 months? Yes- Date of last eye exam: 7/15/19    BP Readings from Last 2 Encounters:   01/16/19 135/70   09/27/18 160/76     Hemoglobin A1C (%)   Date Value   01/16/2019 6.6 (H)   09/27/2018 6.5 (H)     LDL Cholesterol Calculated (mg/dL)   Date Value   03/14/2017 87     LDL Cholesterol Direct (mg/dL)   Date Value   01/16/2019 75   04/12/2018 75       Diabetes Management Resources      How many servings of fruits and vegetables do you eat daily?  0-1    On average, how many sweetened beverages do you drink each day (soda, juice, sweet tea, etc)?   Occasional watered down cranberry juice     How many days per week do you miss taking your medication? 0    Needs Metformin refilled - only has 2 days left                Reviewed and updated as needed this visit by Provider         Review of Systems   ROS COMP: Constitutional, HEENT, cardiovascular, pulmonary, gi and gu systems are negative, except as otherwise noted.      Objective    There were no vitals taken for this visit.  There is no height or weight on file to calculate BMI.  Physical Exam   GENERAL: healthy, alert and no  distress  NECK: no adenopathy, no asymmetry, masses, or scars and thyroid normal to palpation  RESP: lungs clear to auscultation - no rales, rhonchi or wheezes  CV: regular rate and rhythm, normal S1 S2, no S3 or S4, no murmur, click or rub, no peripheral edema and peripheral pulses strong  ABDOMEN: soft, nontender, no hepatosplenomegaly, no masses and bowel sounds normal  MS: no gross musculoskeletal defects noted, no edema            Assessment & Plan     1. Well controlled type 2 diabetes mellitus (H)  -Metformin 750 mg XR  - follow up in 6 months for recheck.  - Albumin Random Urine Quantitative with Creat Ratio  - HEMOGLOBIN A1C  - FOOT EXAM  - EYE EXAM (SIMPLE-NONBILLABLE)    2. Mixed hyperlipidemia    - simvastatin (ZOCOR) 20 MG tablet; TAKE 1 TABLET(20 MG) BY MOUTH AT BEDTIME  Dispense: 90 tablet; Refill: 1         Anand Gonsalves MD  [facility]

## 2019-08-16 NOTE — LETTER
August 19, 2019      Yury Noriega  [address] [place] [address]        Dear ,    I have reviewed your recent labs. Here are the results:     -A1C (test of diabetes control the last 2-3 months) is at your goal. Please continue with your current plan. Also, you should make an appointment to see me and recheck your A1C test in 6 months.   - Protein in the urine is stable , though slightly elevated. Better control of diabetes and avoiding salt and controlling your Blood pressure will help.     Resulted Orders   Albumin Random Urine Quantitative with Creat Ratio   Result Value Ref Range    Creatinine Urine 33 mg/dL    Albumin Urine mg/L 13 mg/L    Albumin Urine mg/g Cr 40.24 (H) 0 - 17 mg/g Cr   HEMOGLOBIN A1C   Result Value Ref Range    Hemoglobin A1C 6.5 (H) 0 - 5.6 %      Comment:      Normal <5.7% Prediabetes 5.7-6.4%  Diabetes 6.5% or higher - adopted from ADA   consensus guidelines.         If you have any questions or concerns, please call the clinic at the number listed above.       Sincerely,        Anand Gonsalves MD

## 2019-08-17 LAB
CREAT UR-MCNC: 33 MG/DL
MICROALBUMIN UR-MCNC: 13 MG/L
MICROALBUMIN/CREAT UR: 40.24 MG/G CR (ref 0–17)

## 2019-09-17 DIAGNOSIS — E78.2 MIXED HYPERLIPIDEMIA: ICD-10-CM

## 2019-09-17 DIAGNOSIS — E11.9 WELL CONTROLLED TYPE 2 DIABETES MELLITUS (H): ICD-10-CM

## 2019-09-17 DIAGNOSIS — E11.9 TYPE 2 DIABETES MELLITUS WITHOUT COMPLICATION, UNSPECIFIED WHETHER LONG TERM INSULIN USE (H): ICD-10-CM

## 2019-09-17 NOTE — TELEPHONE ENCOUNTER
Reason for Call:  Medication or medication refill:    Do you use a [facility]?  Name of the pharmacy and phone number for the current request:  [facility]    Name of the medication requested: blood glucose (ACCU-CHEK DIONICIO PLUS) test strip and blood glucose monitoring (ACCU-CHEK FASTCLIX) lancets    Other request: n/a    Can we leave a detailed message on this number? YES    Phone number patient can be reached at: Cell number on file:    Telephone Information:   Mobile [phone]       Best Time: any    Call taken on 9/17/2019 at 10:28 AM by Maritza Baptiste

## 2019-09-18 ENCOUNTER — TELEPHONE (OUTPATIENT)
Dept: FAMILY MEDICINE | Facility: CLINIC | Age: 74
End: 2019-09-18

## 2019-09-18 RX ORDER — METFORMIN HYDROCHLORIDE 750 MG/1
TABLET, EXTENDED RELEASE ORAL
Qty: 90 TABLET | Refills: 1 | OUTPATIENT
Start: 2019-09-18

## 2019-09-18 RX ORDER — LANCETS
EACH MISCELLANEOUS
Qty: 102 EACH | Refills: 1 | Status: SHIPPED | OUTPATIENT
Start: 2019-09-18 | End: 2020-04-02

## 2019-09-18 NOTE — TELEPHONE ENCOUNTER
Called patient to verify frequency, he is supposed to check blood sure one time daily. MD Emmanuel please disregard last message sent to you. Thanks!    Called [facility] pharmacy and gave verbal that patient checks one time daily.     Lucille Herbert RN, BSN  [facility]

## 2019-09-18 NOTE — TELEPHONE ENCOUNTER
"Requested Prescriptions   Pending Prescriptions Disp Refills     metFORMIN (GLUCOPHAGE-XR) 750 MG 24 hr tablet 90 tablet 1     Sig: TAKE 1 TABLET BY MOUTH DAILY WITH DINNER  Last Written Prescription Date:  8/16/19  Last Fill Quantity: 90,  # refills: 1   Last office visit: 8/16/2019 with prescribing provider:  Major   Future Office Visit:           Biguanide Agents Passed - 9/17/2019  6:20 PM        Passed - Blood pressure less than 140/90 in past 6 months     BP Readings from Last 3 Encounters:   08/16/19 136/78   01/16/19 135/70   09/27/18 160/76                 Passed - Patient has documented LDL within the past 12 mos.     Recent Labs   Lab Test 01/16/19  1054   LDL 75             Passed - Patient has had a Microalbumin in the past 15 mos.     Recent Labs   Lab Test 08/16/19  1348   MICROL 13   UMALCR 40.24*             Passed - Patient is age 10 or older        Passed - Patient has documented A1c within the specified period of time.     If HgbA1C is 8 or greater, it needs to be on file within the past 3 months.  If less than 8, must be on file within the past 6 months.     Recent Labs   Lab Test 08/16/19  1349   A1C 6.5*             Passed - Patient's CR is NOT>1.4 OR Patient's EGFR is NOT<45 within past 12 mos.     Recent Labs   Lab Test 01/16/19  1054   GFRESTIMATED 80   GFRESTBLACK >90       Recent Labs   Lab Test 01/16/19  1054   CR 0.95             Passed - Patient does NOT have a diagnosis of CHF.        Passed - Medication is active on med list        Passed - Recent (6 mo) or future (30 days) visit within the authorizing provider's specialty     Patient had office visit in the last 6 months or has a visit in the next 30 days with authorizing provider or within the authorizing provider's specialty.  See \"Patient Info\" tab in inbasket, or \"Choose Columns\" in Meds & Orders section of the refill encounter.              "

## 2019-09-18 NOTE — TELEPHONE ENCOUNTER
Call Back     Detailed comments: Blood Sugar testing supplies     Phone Number Patient can be reached at: Other phone number:  [phone]    Best Time: any    Can we leave a detailed message on this number? Yes    Call taken on 9/18/2019 at 10:32 AM by Isis Landers

## 2019-09-18 NOTE — TELEPHONE ENCOUNTER
Called [facility] pharmacy back since they had questions about patients diabetic supplies and wanted to know the frequency of how often patient should be checking blood sugar. On the order for the diabetic supplies it says test daily and they need to know if it is ONE time daily?     Routing to PCP to verify if he wants patient to test one time daily or more?       Thank you.    Lucille Herbert RN, BSN  [facility]

## 2019-09-18 NOTE — TELEPHONE ENCOUNTER
"Requested Prescriptions   Pending Prescriptions Disp Refills     blood glucose (ACCU-CHEK DIONICIO PLUS) test strip 100 strip 1     Sig: TEST DAILY OR AS DIRECTED  Last Written Prescription Date:  2/25/19  Last Fill Quantity: 100,  # refills: 1   Last office visit: 8/16/2019 with prescribing provider:  Major   Future Office Visit:           Diabetic Supplies Protocol Passed - 9/17/2019  6:17 PM        Passed - Medication is active on med list        Passed - Patient is 18 years of age or older        Passed - Recent (6 mo) or future (30 days) visit within the authorizing provider's specialty     Patient had office visit in the last 6 months or has a visit in the next 30 days with authorizing provider.  See \"Patient Info\" tab in inbasket, or \"Choose Columns\" in Meds & Orders section of the refill encounter.            blood glucose monitoring (ACCU-CHEK FASTCLIX) lancets 102 each 1     Sig: USE TO TEST BLOOD SUGAR ONE TIME DAILY OR AS DIRECTED  Last Written Prescription Date:  2/25/19  Last Fill Quantity: 102,  # refills: 1   Last office visit: 8/16/2019 with prescribing provider:  Major   Future Office Visit:           Diabetic Supplies Protocol Passed - 9/17/2019  6:17 PM        Passed - Medication is active on med list        Passed - Patient is 18 years of age or older        Passed - Recent (6 mo) or future (30 days) visit within the authorizing provider's specialty     Patient had office visit in the last 6 months or has a visit in the next 30 days with authorizing provider.  See \"Patient Info\" tab in inbasket, or \"Choose Columns\" in Meds & Orders section of the refill encounter.              "

## 2019-09-29 ENCOUNTER — HEALTH MAINTENANCE LETTER (OUTPATIENT)
Age: 74
End: 2019-09-29

## 2020-01-07 DIAGNOSIS — E11.9 WELL CONTROLLED TYPE 2 DIABETES MELLITUS (H): ICD-10-CM

## 2020-01-07 RX ORDER — LISINOPRIL 2.5 MG/1
TABLET ORAL
Qty: 90 TABLET | Refills: 1 | Status: SHIPPED | OUTPATIENT
Start: 2020-01-07 | End: 2020-03-23

## 2020-01-07 NOTE — TELEPHONE ENCOUNTER
Prescription approved per [facility] Refill Protocol.    Lucille Herbert RN, BSN  [facility]

## 2020-01-07 NOTE — TELEPHONE ENCOUNTER
"Requested Prescriptions   Pending Prescriptions Disp Refills     lisinopril (PRINIVIL/ZESTRIL) 2.5 MG tablet [Pharmacy Med Name: LISINOPRIL  Last Written Prescription Date:  1-  Last Fill Quantity: 90 tablet,  # refills: 3   Last office visit: 8/16/2019 with prescribing provider:     Future Office Visit:     2.5MG TABS] 90 tablet 3     Sig: TAKE ONE TABLET BY MOUTH EVERY DAY       ACE Inhibitors (Including Combos) Protocol Passed - 1/7/2020 11:58 AM        Passed - Blood pressure under 140/90 in past 12 months     BP Readings from Last 3 Encounters:   08/16/19 136/78   01/16/19 135/70   09/27/18 160/76                 Passed - Recent (12 mo) or future (30 days) visit within the authorizing provider's specialty     Patient has had an office visit with the authorizing provider or a provider within the authorizing providers department within the previous 12 mos or has a future within next 30 days. See \"Patient Info\" tab in inbasket, or \"Choose Columns\" in Meds & Orders section of the refill encounter.              Passed - Medication is active on med list        Passed - Patient is age 18 or older        Passed - Normal serum creatinine on file in past 12 months     Recent Labs   Lab Test 01/16/19  1054   CR 0.95             Passed - Normal serum potassium on file in past 12 months     Recent Labs   Lab Test 01/16/19  1054   POTASSIUM 5.1               "

## 2020-02-16 DIAGNOSIS — E11.9 TYPE 2 DIABETES MELLITUS WITHOUT COMPLICATION, UNSPECIFIED WHETHER LONG TERM INSULIN USE (H): ICD-10-CM

## 2020-02-17 NOTE — TELEPHONE ENCOUNTER
"Requested Prescriptions   Pending Prescriptions Disp Refills     blood glucose (ACCU-CHEK DIONICIO PLUS) test strip [Pharmacy Med Name: ACCU-CHEK DIONICIO PLUS  STRP] 100 each 1     Sig: USE TO TEST ONCE DAILY OR AS DIRECTED  Last Written Prescription Date:  9/18/19  Last Fill Quantity: 100,  # refills: 1   Last office visit: 8/16/2019 with prescribing provider:  Major   Future Office Visit:           Diabetic Supplies Protocol Failed - 2/16/2020  7:19 PM        Failed - Recent (6 mo) or future (30 days) visit within the authorizing provider's specialty     Patient had office visit in the last 6 months or has a visit in the next 30 days with authorizing provider.  See \"Patient Info\" tab in inbasket, or \"Choose Columns\" in Meds & Orders section of the refill encounter.            Passed - Medication is active on med list        Passed - Patient is 18 years of age or older          "

## 2020-02-18 NOTE — TELEPHONE ENCOUNTER
Mikala from [facility] pharmacy calling to request new Sig on Rx for test strips to reflect to check once daily. This is d/t Medicare part D will only cover one time daily in not on insulin. Per EMR Pt is not taking insulin currently.  Rx resent to reflect.      Carlos Romero RN   [facility]

## 2020-02-27 DIAGNOSIS — E78.2 MIXED HYPERLIPIDEMIA: ICD-10-CM

## 2020-02-27 DIAGNOSIS — E11.9 WELL CONTROLLED TYPE 2 DIABETES MELLITUS (H): ICD-10-CM

## 2020-02-27 RX ORDER — METFORMIN HYDROCHLORIDE 750 MG/1
TABLET, EXTENDED RELEASE ORAL
Qty: 30 TABLET | Refills: 0 | Status: SHIPPED | OUTPATIENT
Start: 2020-02-27 | End: 2020-03-23

## 2020-02-27 NOTE — TELEPHONE ENCOUNTER
Medication is being filled for 1 time refill only due to:  patient is overdue for office visit.      Routing to team to inform and assist with scheduling. Thank you very much.     Lucille Herbert RN, BSN  [facility]

## 2020-03-15 ENCOUNTER — HEALTH MAINTENANCE LETTER (OUTPATIENT)
Age: 75
End: 2020-03-15

## 2020-03-23 ENCOUNTER — VIRTUAL VISIT (OUTPATIENT)
Dept: FAMILY MEDICINE | Facility: CLINIC | Age: 75
End: 2020-03-23
Payer: MEDICARE

## 2020-03-23 DIAGNOSIS — E11.9 WELL CONTROLLED TYPE 2 DIABETES MELLITUS (H): ICD-10-CM

## 2020-03-23 DIAGNOSIS — E78.2 MIXED HYPERLIPIDEMIA: ICD-10-CM

## 2020-03-23 DIAGNOSIS — E11.649 UNCONTROLLED TYPE 2 DIABETES MELLITUS WITH HYPOGLYCEMIA WITHOUT COMA (H): ICD-10-CM

## 2020-03-23 DIAGNOSIS — Z13.6 CARDIOVASCULAR SCREENING; LDL GOAL LESS THAN 160: Primary | ICD-10-CM

## 2020-03-23 PROCEDURE — G2012 BRIEF CHECK IN BY MD/QHP: HCPCS | Performed by: FAMILY MEDICINE

## 2020-03-23 RX ORDER — SIMVASTATIN 20 MG
TABLET ORAL
Qty: 90 TABLET | Refills: 1 | Status: SHIPPED | OUTPATIENT
Start: 2020-03-23 | End: 2020-10-05

## 2020-03-23 RX ORDER — LISINOPRIL 2.5 MG/1
2.5 TABLET ORAL DAILY
Qty: 90 TABLET | Refills: 1 | Status: SHIPPED | OUTPATIENT
Start: 2020-03-23 | End: 2020-10-09

## 2020-03-23 RX ORDER — METFORMIN HYDROCHLORIDE 750 MG/1
TABLET, EXTENDED RELEASE ORAL
Qty: 90 TABLET | Refills: 1 | Status: SHIPPED | OUTPATIENT
Start: 2020-03-23 | End: 2020-10-09

## 2020-03-23 NOTE — PROGRESS NOTES
"Yury Noriega is a 74 year old male who is being evaluated via a billable telephone visit.      The patient has been notified of following:     \"This telephone visit will be conducted via a call between you and your physician/provider. We have found that certain health care needs can be provided without the need for a physical exam.  This service lets us provide the care you need with a short phone conversation.  If a prescription is necessary we can send it directly to your pharmacy.  If lab work is needed we can place an order for that and you can then stop by our lab to have the test done at a later time.    If during the course of the call the physician/provider feels a telephone visit is not appropriate, you will not be charged for this service.\"     Yury Noriega complains of    Chief Complaint   Patient presents with     Follow Up     x 6 month check up diabetic - everything is going good stated pt. w/ no concerns.          I have reviewed and updated the patient's Past Medical History, Social History, Family History and Medication List.    ALLERGIES  Patient has no known allergies.    Assessment/Plan:  1. CARDIOVASCULAR SCREENING; LDL GOAL LESS THAN 160  Has been stable with current dose of meds, has no side effect from the meds,   Will keep monitoring   - Hemoglobin A1c; Future  - Comprehensive metabolic panel; Future  - Lipid panel reflex to direct LDL Fasting; Future  - TSH with free T4 reflex; Future    2. Uncontrolled type 2 diabetes mellitus with hypoglycemia without coma (H)  Stable, has no sx of worsening clinical sx, will keep monitoring   - Hemoglobin A1c; Future  - Comprehensive metabolic panel; Future  - Lipid panel reflex to direct LDL Fasting; Future  - TSH with free T4 reflex; Future    3. Well controlled type 2 diabetes mellitus (H)  Mentioned above  - metFORMIN (GLUCOPHAGE-XR) 750 MG 24 hr tablet; TAKE ONE TABLET BY MOUTH EVERY DAY WITH DINNER  Dispense: 90 tablet; Refill: " 1  - lisinopril (ZESTRIL) 2.5 MG tablet; Take 1 tablet (2.5 mg) by mouth daily  Dispense: 90 tablet; Refill: 1    4. Mixed hyperlipidemia  Stable   - metFORMIN (GLUCOPHAGE-XR) 750 MG 24 hr tablet; TAKE ONE TABLET BY MOUTH EVERY DAY WITH DINNER  Dispense: 90 tablet; Refill: 1  - simvastatin (ZOCOR) 20 MG tablet; TAKE 1 TABLET(20 MG) BY MOUTH AT BEDTIME  Dispense: 90 tablet; Refill: 1    Phone call duration:  12 minutes    Zhou Emmanuel MD

## 2020-04-02 DIAGNOSIS — E78.2 MIXED HYPERLIPIDEMIA: ICD-10-CM

## 2020-04-02 RX ORDER — LANCETS
EACH MISCELLANEOUS
Qty: 100 EACH | Refills: 2 | Status: SHIPPED | OUTPATIENT
Start: 2020-04-02 | End: 2021-03-14

## 2020-04-02 NOTE — TELEPHONE ENCOUNTER
"Requested Prescriptions   Pending Prescriptions Disp Refills     blood glucose monitoring (ACCU-CHEK FASTCLIX) lancets [Pharmacy Med Name: ACCU-CHEK FASTCLIX LANCETS  MISC] 102 each 1     Sig: USE TO TEST ONCE DAILY OR AS DIRECTED       Last Written Prescription Date:  2/18/2020  Last Fill Quantity: 100,  # refills: 1   Last office visit: 3/23/2020 with prescribing provider:     Future Office Visit:          Diabetic Supplies Protocol Passed - 4/2/2020  1:39 PM        Passed - Medication is active on med list        Passed - Patient is 18 years of age or older        Passed - Recent (6 mo) or future (30 days) visit within the authorizing provider's specialty     Patient had office visit in the last 6 months or has a visit in the next 30 days with authorizing provider.  See \"Patient Info\" tab in inbasket, or \"Choose Columns\" in Meds & Orders section of the refill encounter.               "

## 2020-07-18 ENCOUNTER — APPOINTMENT (OUTPATIENT)
Dept: GENERAL RADIOLOGY | Facility: CLINIC | Age: 75
End: 2020-07-18
Attending: EMERGENCY MEDICINE
Payer: MEDICARE

## 2020-07-18 ENCOUNTER — HOSPITAL ENCOUNTER (OUTPATIENT)
Facility: CLINIC | Age: 75
Setting detail: OBSERVATION
Discharge: HOME | End: 2020-07-19
Attending: EMERGENCY MEDICINE | Admitting: INTERNAL MEDICINE
Payer: MEDICARE

## 2020-07-18 DIAGNOSIS — R07.9 ACUTE CHEST PAIN: ICD-10-CM

## 2020-07-18 LAB
ALBUMIN SERPL-MCNC: 4.1 G/DL (ref 3.4–5)
ALP SERPL-CCNC: 60 U/L (ref 40–150)
ALT SERPL W P-5'-P-CCNC: 16 U/L (ref 0–70)
ANION GAP SERPL CALCULATED.3IONS-SCNC: 5 MMOL/L (ref 3–14)
AST SERPL W P-5'-P-CCNC: 8 U/L (ref 0–45)
BASOPHILS # BLD AUTO: 0 10E9/L (ref 0–0.2)
BASOPHILS NFR BLD AUTO: 0.4 %
BILIRUB SERPL-MCNC: 0.5 MG/DL (ref 0.2–1.3)
BUN SERPL-MCNC: 29 MG/DL (ref 7–30)
CALCIUM SERPL-MCNC: 9.3 MG/DL (ref 8.5–10.1)
CHLORIDE SERPL-SCNC: 107 MMOL/L (ref 94–109)
CO2 SERPL-SCNC: 28 MMOL/L (ref 20–32)
CREAT SERPL-MCNC: 1.06 MG/DL (ref 0.66–1.25)
D DIMER PPP FEU-MCNC: <0.3 UG/ML FEU (ref 0–0.5)
DIFFERENTIAL METHOD BLD: NORMAL
EOSINOPHIL # BLD AUTO: 0.2 10E9/L (ref 0–0.7)
EOSINOPHIL NFR BLD AUTO: 2.7 %
ERYTHROCYTE [DISTWIDTH] IN BLOOD BY AUTOMATED COUNT: 12.7 % (ref 10–15)
GFR SERPL CREATININE-BSD FRML MDRD: 69 ML/MIN/{1.73_M2}
GLUCOSE BLDC GLUCOMTR-MCNC: 105 MG/DL (ref 70–99)
GLUCOSE BLDC GLUCOMTR-MCNC: 118 MG/DL (ref 70–99)
GLUCOSE BLDC GLUCOMTR-MCNC: 165 MG/DL (ref 70–99)
GLUCOSE SERPL-MCNC: 164 MG/DL (ref 70–99)
HBA1C MFR BLD: 6.1 % (ref 0–5.6)
HCT VFR BLD AUTO: 40.5 % (ref 40–53)
HGB BLD-MCNC: 13.3 G/DL (ref 13.3–17.7)
IMM GRANULOCYTES # BLD: 0 10E9/L (ref 0–0.4)
IMM GRANULOCYTES NFR BLD: 0.1 %
INTERPRETATION ECG - MUSE: NORMAL
LYMPHOCYTES # BLD AUTO: 1.3 10E9/L (ref 0.8–5.3)
LYMPHOCYTES NFR BLD AUTO: 15.7 %
MCH RBC QN AUTO: 29.4 PG (ref 26.5–33)
MCHC RBC AUTO-ENTMCNC: 32.8 G/DL (ref 31.5–36.5)
MCV RBC AUTO: 90 FL (ref 78–100)
MONOCYTES # BLD AUTO: 0.4 10E9/L (ref 0–1.3)
MONOCYTES NFR BLD AUTO: 5.2 %
NEUTROPHILS # BLD AUTO: 6.1 10E9/L (ref 1.6–8.3)
NEUTROPHILS NFR BLD AUTO: 75.9 %
NRBC # BLD AUTO: 0 10*3/UL
NRBC BLD AUTO-RTO: 0 /100
PLATELET # BLD AUTO: 186 10E9/L (ref 150–450)
POTASSIUM SERPL-SCNC: 3.8 MMOL/L (ref 3.4–5.3)
PROT SERPL-MCNC: 7.5 G/DL (ref 6.8–8.8)
RBC # BLD AUTO: 4.52 10E12/L (ref 4.4–5.9)
SODIUM SERPL-SCNC: 140 MMOL/L (ref 133–144)
TROPONIN I SERPL-MCNC: <0.015 UG/L (ref 0–0.04)
TSH SERPL DL<=0.005 MIU/L-ACNC: 0.49 MU/L (ref 0.4–4)
WBC # BLD AUTO: 8 10E9/L (ref 4–11)

## 2020-07-18 PROCEDURE — C9803 HOPD COVID-19 SPEC COLLECT: HCPCS

## 2020-07-18 PROCEDURE — 84484 ASSAY OF TROPONIN QUANT: CPT | Performed by: PHYSICIAN ASSISTANT

## 2020-07-18 PROCEDURE — 85379 FIBRIN DEGRADATION QUANT: CPT | Performed by: EMERGENCY MEDICINE

## 2020-07-18 PROCEDURE — 93005 ELECTROCARDIOGRAM TRACING: CPT

## 2020-07-18 PROCEDURE — 85025 COMPLETE CBC W/AUTO DIFF WBC: CPT | Performed by: EMERGENCY MEDICINE

## 2020-07-18 PROCEDURE — 99220 ZZC INITIAL OBSERVATION CARE,LEVL III: CPT | Performed by: PHYSICIAN ASSISTANT

## 2020-07-18 PROCEDURE — U0003 INFECTIOUS AGENT DETECTION BY NUCLEIC ACID (DNA OR RNA); SEVERE ACUTE RESPIRATORY SYNDROME CORONAVIRUS 2 (SARS-COV-2) (CORONAVIRUS DISEASE [COVID-19]), AMPLIFIED PROBE TECHNIQUE, MAKING USE OF HIGH THROUGHPUT TECHNOLOGIES AS DESCRIBED BY CMS-2020-01-R: HCPCS | Performed by: EMERGENCY MEDICINE

## 2020-07-18 PROCEDURE — 25000132 ZZH RX MED GY IP 250 OP 250 PS 637: Mod: GY | Performed by: PHYSICIAN ASSISTANT

## 2020-07-18 PROCEDURE — 25000131 ZZH RX MED GY IP 250 OP 636 PS 637: Mod: GY | Performed by: PHYSICIAN ASSISTANT

## 2020-07-18 PROCEDURE — 84484 ASSAY OF TROPONIN QUANT: CPT | Mod: 91 | Performed by: EMERGENCY MEDICINE

## 2020-07-18 PROCEDURE — 80053 COMPREHEN METABOLIC PANEL: CPT | Performed by: EMERGENCY MEDICINE

## 2020-07-18 PROCEDURE — 96372 THER/PROPH/DIAG INJ SC/IM: CPT

## 2020-07-18 PROCEDURE — G0378 HOSPITAL OBSERVATION PER HR: HCPCS

## 2020-07-18 PROCEDURE — 83036 HEMOGLOBIN GLYCOSYLATED A1C: CPT | Performed by: PHYSICIAN ASSISTANT

## 2020-07-18 PROCEDURE — 00000146 ZZHCL STATISTIC GLUCOSE BY METER IP

## 2020-07-18 PROCEDURE — 99285 EMERGENCY DEPT VISIT HI MDM: CPT | Mod: 25

## 2020-07-18 PROCEDURE — 84443 ASSAY THYROID STIM HORMONE: CPT | Performed by: PHYSICIAN ASSISTANT

## 2020-07-18 PROCEDURE — 36415 COLL VENOUS BLD VENIPUNCTURE: CPT | Performed by: PHYSICIAN ASSISTANT

## 2020-07-18 PROCEDURE — 71045 X-RAY EXAM CHEST 1 VIEW: CPT

## 2020-07-18 RX ORDER — ACETAMINOPHEN 325 MG/1
650 TABLET ORAL EVERY 4 HOURS PRN
Status: DISCONTINUED | OUTPATIENT
Start: 2020-07-18 | End: 2020-07-19 | Stop reason: HOSPADM

## 2020-07-18 RX ORDER — NALOXONE HYDROCHLORIDE 0.4 MG/ML
.1-.4 INJECTION, SOLUTION INTRAMUSCULAR; INTRAVENOUS; SUBCUTANEOUS
Status: DISCONTINUED | OUTPATIENT
Start: 2020-07-18 | End: 2020-07-19 | Stop reason: HOSPADM

## 2020-07-18 RX ORDER — ALUMINA, MAGNESIA, AND SIMETHICONE 2400; 2400; 240 MG/30ML; MG/30ML; MG/30ML
30 SUSPENSION ORAL EVERY 4 HOURS PRN
Status: DISCONTINUED | OUTPATIENT
Start: 2020-07-18 | End: 2020-07-19 | Stop reason: HOSPADM

## 2020-07-18 RX ORDER — NITROGLYCERIN 0.4 MG/1
0.4 TABLET SUBLINGUAL EVERY 5 MIN PRN
Status: DISCONTINUED | OUTPATIENT
Start: 2020-07-18 | End: 2020-07-19 | Stop reason: HOSPADM

## 2020-07-18 RX ORDER — DEXTROSE MONOHYDRATE 25 G/50ML
25-50 INJECTION, SOLUTION INTRAVENOUS
Status: DISCONTINUED | OUTPATIENT
Start: 2020-07-18 | End: 2020-07-19 | Stop reason: HOSPADM

## 2020-07-18 RX ORDER — LIDOCAINE 40 MG/G
CREAM TOPICAL
Status: DISCONTINUED | OUTPATIENT
Start: 2020-07-18 | End: 2020-07-19 | Stop reason: HOSPADM

## 2020-07-18 RX ORDER — LISINOPRIL 2.5 MG/1
2.5 TABLET ORAL DAILY
Status: DISCONTINUED | OUTPATIENT
Start: 2020-07-18 | End: 2020-07-19 | Stop reason: HOSPADM

## 2020-07-18 RX ORDER — NICOTINE POLACRILEX 4 MG
15-30 LOZENGE BUCCAL
Status: DISCONTINUED | OUTPATIENT
Start: 2020-07-18 | End: 2020-07-19 | Stop reason: HOSPADM

## 2020-07-18 RX ORDER — ASPIRIN 81 MG/1
162 TABLET, CHEWABLE ORAL ONCE
Status: DISCONTINUED | OUTPATIENT
Start: 2020-07-18 | End: 2020-07-19 | Stop reason: HOSPADM

## 2020-07-18 RX ORDER — ACETAMINOPHEN 650 MG/1
650 SUPPOSITORY RECTAL EVERY 4 HOURS PRN
Status: DISCONTINUED | OUTPATIENT
Start: 2020-07-18 | End: 2020-07-19 | Stop reason: HOSPADM

## 2020-07-18 RX ORDER — SIMVASTATIN 20 MG
20 TABLET ORAL AT BEDTIME
Status: DISCONTINUED | OUTPATIENT
Start: 2020-07-18 | End: 2020-07-19 | Stop reason: HOSPADM

## 2020-07-18 RX ORDER — ASPIRIN 81 MG/1
81 TABLET ORAL DAILY
Status: DISCONTINUED | OUTPATIENT
Start: 2020-07-19 | End: 2020-07-19 | Stop reason: HOSPADM

## 2020-07-18 RX ADMIN — SIMVASTATIN 20 MG: 20 TABLET, FILM COATED ORAL at 21:51

## 2020-07-18 RX ADMIN — LISINOPRIL 2.5 MG: 2.5 TABLET ORAL at 12:29

## 2020-07-18 RX ADMIN — INSULIN ASPART 1 UNITS: 100 INJECTION, SOLUTION INTRAVENOUS; SUBCUTANEOUS at 16:48

## 2020-07-18 ASSESSMENT — ENCOUNTER SYMPTOMS
CHILLS: 0
FEVER: 0
COUGH: 0

## 2020-07-18 NOTE — LETTER
July 22, 2020        Yury Noriega  [address] [place] [address]    This letter provides a written record that you were tested for COVID-19 on 7/18/20.       Your result was negative. This means that we didn t find the virus that causes COVID-19 in your sample. A test may show negative when you do actually have the virus. This can happen when the virus is in the early stages of infection, before you feel illness symptoms.    If you have symptoms   Stay home and away from others (self-isolate) until you meet ALL of the guidelines below:    You ve had no fever--and no medicine that reduces fever--for 3 full days (72 hours). And      Your other symptoms have gotten better. For example, your cough or breathing has improved. And     At least 10 days have passed since your symptoms started.    During this time:    Stay home. Don t go to work, school or anywhere else.     Stay in your own room, including for meals. Use your own bathroom if you can.    Stay away from others in your home. No hugging, kissing or shaking hands. No visitors.    Clean  high touch  surfaces often (doorknobs, counters, handles, etc.). Use a household cleaning spray or wipes. You can find a full list on the EPA website at www.epa.gov/pesticide-registration/list-n-disinfectants-use-against-sars-cov-2.    Cover your mouth and nose with a mask, tissue or washcloth to avoid spreading germs.    Wash your hands and face often with soap and water.    Going back to work  Check with your employer for any guidelines to follow for going back to work.    Employers: This document serves as formal notice that your employee tested negative for COVID-19, as of the testing date shown above.

## 2020-07-18 NOTE — PROGRESS NOTES
Pt arrived to unit around 1100.  AO4.  Independent in room.  Observation status for CP, denied CP this shift.  Denies pain, SOB.  VSS on RA.  Cardiac diet.  Stress test tomorrow.  Serial trops negative.  Possible Discharge tomorrow

## 2020-07-18 NOTE — PROGRESS NOTES
RECEIVING UNIT ED HANDOFF REVIEW    ED Nurse Handoff Report was reviewed by: Mariel Butts RN on July 18, 2020 at 9:57 AM

## 2020-07-18 NOTE — H&P
[facility]    History and Physical - Hospitalist Service       Date of Admission:  7/18/2020    Assessment & Plan   Yury Noriega is a 74 year old male with a past medical history of diabetes mellitus type II, hypertension, hyperlipidemia who presented to the Emergency Department for evaluation of chest pain. He is admitted under observation care for ongoing workup.    Chest pain  Hypertension  Hyperlipidemia  Presented with abrupt onset of cp waking pt at night, pleuritic in nature. No associated sob. Typically walks 2-3 miles daily without symptoms. No family hx of CAD. Nonsmoker. Initial troponin neg. D-dimer nonelevated. CXR nml. BPs elevated on admission.  - Serial troponins  - Tele  - Exercise stress echo in AM  - FLP in AM  - Continue PTA lisinopril, simvastatin    Diabetes Mellitus Type II  Hgb A1c 6.5%. PTA regimen consists of metformin 750mg daily.  - Holding PTA metformin  - Accuchecks per protocol  - Medium ssi  - Mod CHO diet     Diet:   Cardiac, Mod CHO  DVT Prophylaxis: Pneumatic Compression Devices and Ambulate every shift  Botello Catheter: not present  Code Status:   Full code         Disposition Plan   Expected discharge: Tomorrow, recommended to prior living arrangement once workup completed.  Entered: Silver Ibarra PA-C 07/18/2020, 10:42 AM     The patient's care was discussed with the Attending Physician, Dr. Hannah, Bedside Nurse and Patient.    Silver Ibarra PA-C  [facility]    ______________________________________________________________________    Chief Complaint   Chest pain    History is obtained from the patient    History of Present Illness   Yury Noriega is a 74 year old male with a past medical history of diabetes mellitus type II, hypertension, hyperlipidemia who presented to the Emergency Department for evaluation of chest pain. Patient describes waking at 0400 this morning, turning over in bed and noting right-sided chest  pressure/pain. No radiation, no associated symptoms. Pain resolved prior to arrival in ED.     On arrival in ED, pt was seen by Dr. Merrill. He was hemodynamically stable, remained cp free. Workup included EKG which was without ischemic changes, troponin, CMP, CBC, D-dimer, and CXR which were all unremarkable. Given risk factors and presenting symptom, requested observation admission for chest pain rule-out.    Patient is presently evaluated in hospital room. He currently denies chest pain, does indicate if he takes a deep breath there is mild discomfort but is not the same as what prompted evaluation. Reports within the last week he's been in otherwise normal state of health. He typically walks 2-3 miles daily without symptoms. Denies recent fever, chills, sob, cough, change in bowel/bladder state, leg edema.    Review of Systems    The 10 point Review of Systems is negative other than noted in the HPI or here.     Past Medical History    I have reviewed this patient's medical history and updated it with pertinent information if needed.   Past Medical History:   Diagnosis Date     BPH (benign prostatic hyperplasia) 10/13/2015     CARDIOVASCULAR SCREENING; LDL GOAL LESS THAN 160 10/13/2015     High cholesterol      Hypertension      Type 2 diabetes mellitus, uncontrolled (H) 10/19/2015       Past Surgical History   I have reviewed this patient's surgical history and updated it with pertinent information if needed.  Past Surgical History:   Procedure Laterality Date     AS CLOSED RX FEMUR SHAFT FX       COLONOSCOPY N/A 1/5/2016    Procedure: COLONOSCOPY;  Surgeon: Jose Luis Nunez MD;  Location:  GI       Social History   I have reviewed this patient's social history and updated it with pertinent information if needed.      Family History   I have reviewed this patient's family history and updated it with pertinent information if needed.  Family History   Problem Relation Age of Onset     Diabetes Mother         Prior to Admission Medications   Prior to Admission Medications   Prescriptions Last Dose Informant Patient Reported? Taking?   blood glucose (ACCU-CHEK DIONICIO PLUS) VI test strip   No No   Sig: USE TO TEST ONCE DAILY   blood glucose monitoring (ACCU-CHEK DIONICIO PLUS) meter device kit   No No   Sig: Use to test blood sugars 1 times daily or as directed.   blood glucose monitoring (ACCU-CHEK FASTCLIX) lancets   No No   Sig: USE TO TEST ONCE DAILY OR AS DIRECTED   lisinopril (ZESTRIL) 2.5 MG tablet 7/17/2020 at am  No Yes   Sig: Take 1 tablet (2.5 mg) by mouth daily   metFORMIN (GLUCOPHAGE-XR) 750 MG 24 hr tablet 7/17/2020 at 1800  No Yes   Sig: TAKE ONE TABLET BY MOUTH EVERY DAY WITH DINNER   simvastatin (ZOCOR) 20 MG tablet 7/17/2020 at hs  No Yes   Sig: TAKE 1 TABLET(20 MG) BY MOUTH AT BEDTIME      Facility-Administered Medications: None     Allergies   No Known Allergies    Physical Exam   Vital Signs: Temp: 98.4  F (36.9  C) Temp src: Oral BP: (!) 141/53 Pulse: 71 Heart Rate: 62 Resp: 15 SpO2: 100 % O2 Device: None (Room air)    Weight: 0 lbs 0 oz    GEN: well-developed, well-nourished, appears comfortable  HEENT: NCAT, EOM intact bilaterally, sclera clear, conjunctiva normal, nose & mouth patent, mucous membranes moist  CHEST: lungs CTA bilaterally, no increased work of breathing, no wheeze, crackles, rhonchi  HEART: RRR, S1 & S2, no murmur  ABD: soft, nontender, nondistended, no guarding or rigidity, +BS in all 4 quadrants  MSK: AROM bilateral UE/LE, pedal & radial pulses 2+ bilaterally  NEURO: awake, alert, oriented to name, place, and time. CN II-XII grossly intact. Sensation grossly intact to light touch.   SKIN: warm & dry without rash, no pedal edema    Data   Data reviewed today: I reviewed all medications, new labs and imaging results over the last 24 hours. I personally reviewed the EKG tracing showing mild TWI in V1, incomplete RBBB.    Recent Labs   Lab 07/18/20  0800   WBC 8.0   HGB 13.3   MCV 90          POTASSIUM 3.8   CHLORIDE 107   CO2 28   BUN 29   CR 1.06   ANIONGAP 5   JULIAN 9.3   *   ALBUMIN 4.1   PROTTOTAL 7.5   BILITOTAL 0.5   ALKPHOS 60   ALT 16   AST 8   TROPI <0.015     Recent Results (from the past 24 hour(s))   XR Chest Port 1 View    Narrative    XR CHEST PORT 1 VW 7/18/2020 8:15 AM    HISTORY: chest pain    COMPARISON: None.      Impression    IMPRESSION: The cardiac silhouette and pulmonary vasculature are  within normal limits. No focal pulmonary consolidations. No pleural  effusion or pneumothorax.    PHILLIP VILLAFUERTE MD      Need for prophylactic antibiotic

## 2020-07-18 NOTE — ED NOTES
"[facility]  ED Nurse Handoff Report    ED Chief complaint: Chest Pain      ED Diagnosis:   Final diagnoses:   Acute chest pain       Code Status: Full Code    Allergies: No Known Allergies    Patient Story: Pt. Had chest pain last night and woke up with it this morning.   Focused Assessment:  A/Ox4. Lives at home alone. Chest pain on arrival 1, now chest pain free. SR 62.  Treatments and/or interventions provided: Labs, xray  Patient's response to treatments and/or interventions: Well    To be done/followed up on inpatient unit:  Monitor    Does this patient have any cognitive concerns?: None    Activity level - Baseline/Home:  Independent  Activity Level - Current:   Independent    Patient's Preferred language: English   Needed?: No    Isolation: None  Infection: Not Applicable  Bariatric?: No    Vital Signs:   Vitals:    07/18/20 0752 07/18/20 0800 07/18/20 0900 07/18/20 0932   BP: (!) 134/92   120/79   Pulse: 83   71   Resp: 20 27 17 14   Temp: 98.4  F (36.9  C)      TempSrc: Oral      SpO2: 97% 97% 96% 97%   Height: 1.778 m (5' 10\")          Cardiac Rhythm:     Was the PSS-3 completed:   Yes  What interventions are required if any?               Family Comments: Pt lives at home alone  OBS brochure/video discussed/provided to patient/family: Yes              Name of person given brochure if not patient: NA              Relationship to patient: NA    For the majority of the shift this patient's behavior was Green.   Behavioral interventions performed were None.    ED NURSE PHONE NUMBER: [phone]         "

## 2020-07-18 NOTE — ED TRIAGE NOTES
Midsternal chest pain started last night, woke up with the pain. EMS gives ASA and 3 nitroglycerin S.L. with pain decreasing from 5 to 1/10.

## 2020-07-18 NOTE — ED PROVIDER NOTES
"  History     Chief Complaint:  Chest Pain    HPI   Yury Noriega is a 74 year old male, with a history of hypertension, high cholesterol, and type 2 diabetes, who presents with chest pain which began around 0400. The patient states it was a dull pain that hurt worse when taking a deep breath. The patient states he has no pain now. The patient denies cough, fever, chills, sore throat, myalgias, loss of taste/smell, leg pain, or leg swelling.  No known COVID-19 exposure.    Allergies:  No known drug allergies     Medications:    Zestril  Metformin  Zocor    Past Medical History:    BPH  High cholesterol  Hypertension  Type 2 diabetes    Past Surgical History:    Closed rx femur shaft FX    Family History:    diabetes      Social History:  Smoking status: Never  Alcohol use: Yes  Drug use: No  PCP: Zhou Emmanuel  Marital Status:   [2]     Review of Systems   Constitutional: Negative for chills and fever.   Respiratory: Negative for cough.    Cardiovascular: Positive for chest pain. Negative for leg swelling.   All other systems reviewed and are negative.    Physical Exam     Patient Vitals for the past 24 hrs:   BP Temp Temp src Pulse Resp SpO2 Height   07/18/20 0752 (!) 134/92 98.4  F (36.9  C) Oral 83 20 97 % 1.778 m (5' 10\")       Physical Exam  Nursing note and vitals reviewed.  Constitutional:  Appears well-developed and well-nourished.   HENT:   Head:    Atraumatic.   Mouth/Throat:   Oropharynx is clear and moist. No oropharyngeal exudate.   Eyes:    Pupils are equal, round, and reactive to light.   Neck:    Normal range of motion. Neck supple.      No tracheal deviation present. No thyromegaly present.   Cardiovascular:  Normal rate, regular rhythm, no murmur   Pulmonary/Chest: Breath sounds are clear and equal without wheezes or crackles. Non tender chest wall.   Abdominal:   Soft. Bowel sounds are normal. Exhibits no distension and      no mass. There is no tenderness.      There is no rebound " and no guarding.   Musculoskeletal:  Exhibits no edema.   Lymphadenopathy:  No cervical adenopathy.   Neurological:   Alert and oriented to person, place, and time.   Skin:    Skin is warm and dry. No rash noted. No pallor.       Emergency Department Course   ECG (08:04:50):  Rate 71 bpm. SD interval 236. QRS duration 94. QT/QTc 388/421. P-R-T axes 70 24 44. Sinus rhythm with 1st degree AV block. Incomplete right bundle branch block. Nonspecific ST abnormality. Abnormal ECG Interpreted at 0814 by Kelsea Merrill MD.      Imaging:  Radiology findings were communicated with the patient who voiced understanding of the findings.    Chest XR, Port 1 view, per radiology:      The cardiac silhouette and pulmonary vasculature are   within normal limits. No focal pulmonary consolidations. No pleural   effusion or pneumothorax.     Laboratory:  Laboratory findings were communicated with the patient who voiced understanding of the findings.    CBC:  AWNL. (WBC 8.0, HGB 13.3, )     D Dimer  <0.3    Troponin (Collected 0800): <0.015    CMP: Glu 164 (H) o/w WNL (Creatinine: 1.06)\    COVID-19 Virus PCR: Pending    Emergency Department Course:  Past medical records, nursing notes, and vitals reviewed.    0800 I performed an exam of the patient as documented above.     EKG obtained in the ED, see results above.   IV was inserted and blood was drawn for laboratory testing, results above.  The patient was sent for a Chest XR while in the emergency department, results above.     0850 I rechecked the patient and discussed the results of his workup thus far.     Findings and plan explained to the Patient who consents to admission. Discussed the patient with Dr. Hannah, who will admit the patient to a Observastion bed for further monitoring, evaluation, and treatment.    I personally reviewed the laboratory  and imaging results with the Patient and answered all related questions prior to admission.     Impression & Plan    Covid-19  Yury Noriega was evaluated during a global COVID-19 pandemic, which necessitated consideration that the patient might be at risk for infection with the SARS-CoV-2 virus that causes COVID-19.   Applicable protocols for evaluation were followed during the patient's care.   COVID-19 was considered as part of the patient's evaluation. The plan for testing is:  a test was obtained during this visit.    Medical Decision Making:  Yury Noriega, this patient has chest pain which is concerning for acute coronary syndrome. His initial troponin and EKG are unremarkable while here. There is no sign of acute myocardial infarction however, he does have significant cardiac risk factors so he was admitted to the hospital for cardiac workup. His PE workup was negative with a negative D dimer and I did not feel there was concern for a pulmonary embolism at this time. Chest XR does not show any pneumonia or pneumothorax. He is admitted to the care of the hospitalist.      Diagnosis:    ICD-10-CM    1. Acute chest pain  R07.9        Disposition:  Admitted to Observation.    Discharge Medications:  New Prescriptions    No medications on file       Scribe Disclosure:  Neno REYNAGA, am serving as a scribe at 8:01 AM on 7/18/2020 to document services personally performed by Kelsea Merrill MD based on my observations and the provider's statements to me.        Kelsea Merrill MD  07/18/20 [id number]

## 2020-07-18 NOTE — ED NOTES
Bed: ED06  Expected date:   Expected time:   Means of arrival:   Comments:  518-74M chest pressure

## 2020-07-18 NOTE — PHARMACY-ADMISSION MEDICATION HISTORY
Pharmacy Medication History  Admission medication history interview status for the 7/18/2020  admission is complete. See EPIC admission navigator for prior to admission medications     Medication history sources: Patient  Medication history source reliability: Good  Adherence assessment: Good    Significant changes made to the medication list:  none      Additional medication history information:   none    Medication reconciliation completed by provider prior to medication history? No    Time spent in this activity: 10min      Prior to Admission medications    Medication Sig Last Dose Taking? Auth Provider   lisinopril (ZESTRIL) 2.5 MG tablet Take 1 tablet (2.5 mg) by mouth daily 7/17/2020 at am Yes Zhou Emmanuel MD   metFORMIN (GLUCOPHAGE-XR) 750 MG 24 hr tablet TAKE ONE TABLET BY MOUTH EVERY DAY WITH DINNER 7/17/2020 at 1800 Yes Zhou Emmanuel MD   simvastatin (ZOCOR) 20 MG tablet TAKE 1 TABLET(20 MG) BY MOUTH AT BEDTIME 7/17/2020 at hs Yes Zhou Emmanuel MD   blood glucose (ACCU-CHEK DIONICIO PLUS) VI test strip USE TO TEST ONCE DAILY   Zhou Emmanuel MD   blood glucose monitoring (ACCU-CHEK DIONICIO PLUS) meter device kit Use to test blood sugars 1 times daily or as directed.   Zhou Emmanuel MD   blood glucose monitoring (ACCU-CHEK FASTCLIX) lancets USE TO TEST ONCE DAILY OR AS DIRECTED   Zhou Emmanuel MD

## 2020-07-19 ENCOUNTER — APPOINTMENT (OUTPATIENT)
Dept: CARDIOLOGY | Facility: CLINIC | Age: 75
End: 2020-07-19
Attending: PHYSICIAN ASSISTANT
Payer: MEDICARE

## 2020-07-19 VITALS
OXYGEN SATURATION: 98 % | RESPIRATION RATE: 16 BRPM | WEIGHT: 135.2 LBS | DIASTOLIC BLOOD PRESSURE: 68 MMHG | SYSTOLIC BLOOD PRESSURE: 125 MMHG | TEMPERATURE: 97.8 F | HEART RATE: 71 BPM | HEIGHT: 70 IN | BODY MASS INDEX: 19.36 KG/M2

## 2020-07-19 LAB
ANION GAP SERPL CALCULATED.3IONS-SCNC: 4 MMOL/L (ref 3–14)
BUN SERPL-MCNC: 22 MG/DL (ref 7–30)
CALCIUM SERPL-MCNC: 8.8 MG/DL (ref 8.5–10.1)
CHLORIDE SERPL-SCNC: 102 MMOL/L (ref 94–109)
CO2 SERPL-SCNC: 28 MMOL/L (ref 20–32)
CREAT SERPL-MCNC: 0.95 MG/DL (ref 0.66–1.25)
GFR SERPL CREATININE-BSD FRML MDRD: 79 ML/MIN/{1.73_M2}
GLUCOSE BLDC GLUCOMTR-MCNC: 100 MG/DL (ref 70–99)
GLUCOSE BLDC GLUCOMTR-MCNC: 153 MG/DL (ref 70–99)
GLUCOSE SERPL-MCNC: 173 MG/DL (ref 70–99)
POTASSIUM SERPL-SCNC: 3.9 MMOL/L (ref 3.4–5.3)
SODIUM SERPL-SCNC: 134 MMOL/L (ref 133–144)

## 2020-07-19 PROCEDURE — 00000146 ZZHCL STATISTIC GLUCOSE BY METER IP

## 2020-07-19 PROCEDURE — 93016 CV STRESS TEST SUPVJ ONLY: CPT | Performed by: INTERNAL MEDICINE

## 2020-07-19 PROCEDURE — 96372 THER/PROPH/DIAG INJ SC/IM: CPT | Mod: XS

## 2020-07-19 PROCEDURE — 93321 DOPPLER ECHO F-UP/LMTD STD: CPT | Mod: 26 | Performed by: INTERNAL MEDICINE

## 2020-07-19 PROCEDURE — 40000264 ECHO STRESS ECHOCARDIOGRAM

## 2020-07-19 PROCEDURE — 93018 CV STRESS TEST I&R ONLY: CPT | Performed by: INTERNAL MEDICINE

## 2020-07-19 PROCEDURE — 99217 ZZC OBSERVATION CARE DISCHARGE: CPT | Performed by: INTERNAL MEDICINE

## 2020-07-19 PROCEDURE — 80048 BASIC METABOLIC PNL TOTAL CA: CPT | Performed by: PHYSICIAN ASSISTANT

## 2020-07-19 PROCEDURE — 93350 STRESS TTE ONLY: CPT | Mod: 26 | Performed by: INTERNAL MEDICINE

## 2020-07-19 PROCEDURE — G0378 HOSPITAL OBSERVATION PER HR: HCPCS

## 2020-07-19 PROCEDURE — 25000132 ZZH RX MED GY IP 250 OP 250 PS 637: Mod: GY | Performed by: PHYSICIAN ASSISTANT

## 2020-07-19 PROCEDURE — 25500064 ZZH RX 255 OP 636: Performed by: INTERNAL MEDICINE

## 2020-07-19 PROCEDURE — 93325 DOPPLER ECHO COLOR FLOW MAPG: CPT | Mod: 26 | Performed by: INTERNAL MEDICINE

## 2020-07-19 PROCEDURE — 36415 COLL VENOUS BLD VENIPUNCTURE: CPT | Performed by: PHYSICIAN ASSISTANT

## 2020-07-19 RX ADMIN — HUMAN ALBUMIN MICROSPHERES AND PERFLUTREN 7 ML: 10; .22 INJECTION, SOLUTION INTRAVENOUS at 08:15

## 2020-07-19 RX ADMIN — LISINOPRIL 2.5 MG: 2.5 TABLET ORAL at 08:49

## 2020-07-19 RX ADMIN — ASPIRIN 81 MG: 81 TABLET ORAL at 08:49

## 2020-07-19 RX ADMIN — INSULIN ASPART 1 UNITS: 100 INJECTION, SOLUTION INTRAVENOUS; SUBCUTANEOUS at 08:59

## 2020-07-19 ASSESSMENT — MIFFLIN-ST. JEOR: SCORE: 1369.51

## 2020-07-19 NOTE — PROGRESS NOTES
MD Notification    Notified Person: MD    Notified Person Name: Nuris    Notification Date/Time: July 18, 2020. 10:40 PM    Notification Interaction: pgd    Purpose of Notification: HR dropping into 40s, pt asymptomatic    Orders Received: continue to monitor tele    Comments:

## 2020-07-19 NOTE — PLAN OF CARE
Given discharge instructions, belongings and discharged to home at 1240, vss and  pain free at the time of discharge.

## 2020-07-19 NOTE — PROGRESS NOTES
Discharge    Patient discharged to  Home  with family  Care plan notedone    Listed belongings gathered and returned to patient. YES   Care Plan and Patient education resolved:YES   Prescriptions if needed, hard copies sent with patient  NA  Home and hospital acquired medications returned to patient: NA  Medication Bin checked and emptied on discharge YES appointment made for patient: NO

## 2020-07-19 NOTE — PROGRESS NOTES
Called by the RN as pt's HR in the 40s tonight he was asymptomatic with it. He is on tele showing sinus mihir with first degree AV block which was present on admission EKG  . TSH ordered. Not on any AV nicole blocking drugs. Continue to monitor closely on tele     Sade Lawler MD

## 2020-07-19 NOTE — DISCHARGE SUMMARY
[facility]  Hospitalist Discharge Summary      Date of Admission:  7/18/2020  Date of Discharge:  7/19/2020  Discharging Provider: Deborah Hannah MD    Discharge Diagnoses   Atypical chest pain, non cardiac , stress test negative   Essential HTN  Hyperlipidemia  Type 2 DM    Follow-ups Needed After Discharge   Follow-up Appointments     Follow-up and recommended labs and tests      Follow up with primary care provider, Zhou Emmanuel, within 7 days to   evaluate treatment change and for hospital follow- up.  No follow up labs   or test are needed.             Unresulted Labs Ordered in the Past 30 Days of this Admission     Date and Time Order Name Status Description    7/18/2020 0801 Asymptomatic COVID-19 Virus (Coronavirus) by PCR In process       These results will be followed up by PCP    Discharge Disposition   Discharged to home  Condition at discharge: Stable    Hospital Course   Yury Noriega is a 74 year old male with a past medical history of diabetes mellitus type II, hypertension, hyperlipidemia who presented to the Emergency Department for evaluation of chest pain. He is admitted under observation care for ongoing workup.here are further details regarding his hospitalization      Chest pain  Hypertension  Hyperlipidemia  Presented with abrupt onset of cp waking pt at night, pleuritic in nature. No associated sob. Typically walks 2-3 miles daily without symptoms. No family hx of CAD. Nonsmoker. Initial troponin neg. D-dimer nonelevated. CXR nml. BPs elevated on admission.  - Serial troponin's stayed negative , patient does not have any further episodes of chest pain , underwent stress ECHO this morning   -- results were reviewed with patient , as below  Stress    The patient exhibited no chest pain during exercise.Exercise was stopped due to fatigue.There was a normal BP response to exercise.  The patient did not exhibit any symptoms during exercise.  A treadmill exercise test  according to the Keron protocol was performed.Target Heart Rate was achieved.  The EKG portion of this stress test was negative for inducible ischemia (see echo results below).  There was a maximum 1.0mm ST segment depression in the inferior lead(s).  Normal resting wall motion and no stress-induced wall motion abnormality.  The visual ejection fraction is estimated at >70%.  Left ventricular cavity size decreases with exercise.  Global LV systolic function augments with exercise.  Baseline:  Normal baseline electrocardiogram.  The patient is in normal sinus rhythm.  No regional wall motion abnormalities noted.  Normal left ventricular function and wall motion at rest.  The visual ejection fraction is estimated at 55-60%.  No hemodynamically significant valvular abnormalities on 2D or color flow  -- fasting lipid profile was checked which was in normal range   -- Continue PTA lisinopril, simvastatin  -- discussed with patient regarding start taking baby Aspirin, he will buy it OTC.     Diabetes Mellitus Type II  Hgb A1c 6.5%. PTA regimen consists of metformin 750mg daily.  -- continue PTA metformin after the discharge     Patient was seen and examined on the day of discharge , he is feeling well, does not have any complaints , I did review the discharge medications and instructions with the patient and plan for him to follow up with the PCP after the hospitalization .patient was in agreement , he is discharged in stable condition back to his home.    Consultations This Hospital Stay   None    Code Status   Full Code    Time Spent on this Encounter   I, Deborah Hannah MD, personally saw the patient today and spent less than or equal to 30 minutes discharging this patient.       Deborah Hannah MD  [facility]  ______________________________________________________________________    Physical Exam   Vital Signs: Temp: 97.8  F (36.6  C) Temp src: Oral BP: 125/68   Heart Rate: 58 Resp: 16 SpO2: 98 % O2  Device: None (Room air)    Weight: 135 lbs 3.2 oz    Physical Exam  Vitals signs and nursing note reviewed.   Constitutional:       Appearance: He is well-developed.   HENT:      Head: Normocephalic and atraumatic.   Eyes:      Pupils: Pupils are equal, round, and reactive to light.   Neck:      Musculoskeletal: Normal range of motion and neck supple.      Thyroid: No thyromegaly.   Cardiovascular:      Rate and Rhythm: Normal rate and regular rhythm.      Heart sounds: Normal heart sounds.   Pulmonary:      Effort: Pulmonary effort is normal. No respiratory distress.      Breath sounds: Normal breath sounds.   Abdominal:      General: Bowel sounds are normal. There is no distension.      Palpations: Abdomen is soft.   Musculoskeletal: Normal range of motion.         General: No tenderness.   Skin:     General: Skin is warm and dry.   Neurological:      Mental Status: He is alert and oriented to person, place, and time.   Psychiatric:         Behavior: Behavior normal.          Primary Care Physician   Zhou Emmanuel    Discharge Orders      Reason for your hospital stay    You were admitted to the hospital secondary to chest pain and underwent stress test which came back normal.     Follow-up and recommended labs and tests    Follow up with primary care provider, Zhou Emmanuel, within 7 days to evaluate treatment change and for hospital follow- up.  No follow up labs or test are needed.     Activity    Your activity upon discharge: activity as tolerated and no driving for today     Discharge Instructions    Please start taking baby Aspirin and continue with the rest of your home medications     Full Code     Diet    Follow this diet upon discharge: Orders Placed This Encounter      Low Saturated Fat Na <2400 mg         Significant Results and Procedures   Results for orders placed or performed during the hospital encounter of 07/18/20   XR Chest Port 1 View    Narrative    XR CHEST PORT 1 VW 7/18/2020 8:15  AM    HISTORY: chest pain    COMPARISON: None.      Impression    IMPRESSION: The cardiac silhouette and pulmonary vasculature are  within normal limits. No focal pulmonary consolidations. No pleural  effusion or pneumothorax.    PHILLIP VILLAFUERTE MD   Echo Stress Echocardiogram    Narrative    [id number]  [id number]  [id number]^JANE^TRINIDAD           [facility]  Echocardiography Laboratory  [address] [place] [address]        Name: NAYELY MEYER  MRN: [id number]  : 1945  Study Date: 2020 07:48 AM  Age: 74 yrs  Gender: Male  Patient Location: [facility]  Reason For Study: Chest Pain  Ordering Physician: TRINIDAD LARA  Referring Physician: DOUGIE ACEVEDO  Performed By: Becki Gandara     BSA: 1.8 m2  Height: 70 in  Weight: 135 lb  HR: 75  BP: 136/74 mmHg  _____________________________________________________________________________  __        Procedure  Stress Echo Complete. Contrast Optison.  _____________________________________________________________________________  __        Interpretation Summary  This was a normal stress echocardiogram with no evidence of stress-induced  ischemia.  _____________________________________________________________________________  __     Stress  The patient exercised 6:32.  The patient exhibited no chest pain during exercise.  Exercise was stopped due to fatigue.  There was a normal BP response to exercise.  The patient did not exhibit any symptoms during exercise.  A treadmill exercise test according to the Keron protocol was performed.  Target Heart Rate was achieved.  The EKG portion of this stress test was negative for inducible ischemia (see  echo results below).  There was a maximum 1.0mm ST segment depression in the inferior lead(s).  Normal resting wall motion and no stress-induced wall motion abnormality.  The visual ejection fraction is estimated at >70%.  Left ventricular cavity size decreases with exercise.  Global LV systolic  function augments with exercise.     Baseline  Normal baseline electrocardiogram.  The patient is in normal sinus rhythm.  No regional wall motion abnormalities noted.  Normal left ventricular function and wall motion at rest.  The visual ejection fraction is estimated at 55-60%.  No hemodynamically significant valvular abnormalities on 2D or color flow  imaging.     Stress Results         Protocol:  Keron Protocol        Maximum Predicted HR:   148 bpm         Target HR: 126 bpm               % Maximum Predicted HR: 91 %               Stage  DurationHeart Rate  BP             Comment                   (mm:ss)   (bpm)           Stage 1   3:00     108    142/76           Stage 2   3:00     127    156/78           Stage 3   0:32     134      /   RPP: 20,904; FAC: AVG; DUKE:6          RECOVERYR  4:00      76    137/82                Stress Duration:   6:32 mm:ss        Recovery Time: 4:00 mm:ss          Maximum Stress HR: 134 bpm           METS:          8     _____________________________________________________________________________  __                             Report approved by: Turner Brown 07/19/2020 08:45 AM                    _____________________________________________________________________________  __            Discharge Medications   Current Discharge Medication List      START taking these medications    Details   aspirin (ASA) 81 MG EC tablet Take 1 tablet (81 mg) by mouth daily  Qty:      Associated Diagnoses: Acute chest pain         CONTINUE these medications which have NOT CHANGED    Details   lisinopril (ZESTRIL) 2.5 MG tablet Take 1 tablet (2.5 mg) by mouth daily  Qty: 90 tablet, Refills: 1    Associated Diagnoses: Well controlled type 2 diabetes mellitus (H)      metFORMIN (GLUCOPHAGE-XR) 750 MG 24 hr tablet TAKE ONE TABLET BY MOUTH EVERY DAY WITH DINNER  Qty: 90 tablet, Refills: 1    Associated Diagnoses: Well controlled type 2 diabetes mellitus (H); Mixed hyperlipidemia       simvastatin (ZOCOR) 20 MG tablet TAKE 1 TABLET(20 MG) BY MOUTH AT BEDTIME  Qty: 90 tablet, Refills: 1    Associated Diagnoses: Mixed hyperlipidemia      blood glucose (ACCU-CHEK DIONICIO PLUS) VI test strip USE TO TEST ONCE DAILY  Qty: 100 each, Refills: 1    Associated Diagnoses: Type 2 diabetes mellitus without complication, unspecified whether long term insulin use (H)      blood glucose monitoring (ACCU-CHEK DIONICIO PLUS) meter device kit Use to test blood sugars 1 times daily or as directed.  Qty: 1 kit, Refills: 0    Associated Diagnoses: Type 2 diabetes mellitus, uncontrolled (H)      blood glucose monitoring (ACCU-CHEK FASTCLIX) lancets USE TO TEST ONCE DAILY OR AS DIRECTED  Qty: 100 each, Refills: 2    Associated Diagnoses: Uncontrolled diabetes mellitus type 2 without complications; Mixed hyperlipidemia           Allergies   No Known Allergies

## 2020-07-20 ENCOUNTER — TELEPHONE (OUTPATIENT)
Dept: FAMILY MEDICINE | Facility: CLINIC | Age: 75
End: 2020-07-20

## 2020-07-20 LAB
SARS-COV-2 RNA SPEC QL NAA+PROBE: NOT DETECTED
SPECIMEN SOURCE: NORMAL

## 2020-07-20 NOTE — TELEPHONE ENCOUNTER
ED / Discharge Outreach Protocol    Patient Contact    Attempt # 1    Was call answered?  No.  Left message on voicemail with information to call me back.      Lucille Herbert RN, BSN  [facility] [place]

## 2020-07-20 NOTE — TELEPHONE ENCOUNTER
Pt was discharged from [facility] on 7/19 after being treated for Acute Chest Pain. Please call the pt. Thank you.  Maureen Xiao,

## 2020-07-21 ENCOUNTER — OFFICE VISIT (OUTPATIENT)
Dept: FAMILY MEDICINE | Facility: CLINIC | Age: 75
End: 2020-07-21
Payer: MEDICARE

## 2020-07-21 VITALS
HEIGHT: 70 IN | DIASTOLIC BLOOD PRESSURE: 64 MMHG | TEMPERATURE: 97.8 F | BODY MASS INDEX: 19.33 KG/M2 | WEIGHT: 135 LBS | OXYGEN SATURATION: 99 % | HEART RATE: 60 BPM | SYSTOLIC BLOOD PRESSURE: 122 MMHG

## 2020-07-21 DIAGNOSIS — R07.89 ATYPICAL CHEST PAIN: Primary | ICD-10-CM

## 2020-07-21 PROCEDURE — 99213 OFFICE O/P EST LOW 20 MIN: CPT | Performed by: FAMILY MEDICINE

## 2020-07-21 ASSESSMENT — MIFFLIN-ST. JEOR: SCORE: 1368.61

## 2020-07-21 NOTE — PROGRESS NOTES
Subjective     Yury Noriega is a 74 year old male who presents to clinic today for the following health issues:    HPI         Hospital Follow-up Visit:    Hospital/Nursing Home/IP Rehab Facility: [facility]  Date of Admission: 7/18/20  Date of Discharge: 7/1920  Reason(s) for Admission: chest pain      Was your hospitalization related to COVID-19? No   Problems taking medications regularly:  None  Medication changes since discharge: None  Problems adhering to non-medication therapy:  None    Summary of hospitalization:  [facility] discharge summary reviewed  Diagnostic Tests/Treatments reviewed.  Follow up needed: none  Other Healthcare Providers Involved in Patient s Care:         None  Update since discharge: improved.       Post Discharge Medication Reconciliation: discharge medications reconciled, continue medications without change.  Plan of care communicated with patient              Patient Active Problem List   Diagnosis     BPH (benign prostatic hyperplasia)     CARDIOVASCULAR SCREENING; LDL GOAL LESS THAN 160     Type 2 diabetes mellitus, uncontrolled (H)     Advance Care Planning     Past Surgical History:   Procedure Laterality Date     AS CLOSED RX FEMUR SHAFT FX       COLONOSCOPY N/A 1/5/2016    Procedure: COLONOSCOPY;  Surgeon: Jose Luis Nunez MD;  Location:  GI       Social History     Tobacco Use     Smoking status: Never Smoker     Smokeless tobacco: Never Used   Substance Use Topics     Alcohol use: Yes     Comment: rarely     Family History   Problem Relation Age of Onset     Diabetes Mother          Current Outpatient Medications   Medication Sig Dispense Refill     aspirin (ASA) 81 MG EC tablet Take 1 tablet (81 mg) by mouth daily       blood glucose (ACCU-CHEK DIONICIO PLUS) VI test strip USE TO TEST ONCE DAILY 100 each 1     blood glucose monitoring (ACCU-CHEK DIONICIO PLUS) meter device kit Use to test blood sugars 1 times daily or as directed. 1 kit 0     blood  "glucose monitoring (ACCU-CHEK FASTCLIX) lancets USE TO TEST ONCE DAILY OR AS DIRECTED 100 each 2     lisinopril (ZESTRIL) 2.5 MG tablet Take 1 tablet (2.5 mg) by mouth daily 90 tablet 1     metFORMIN (GLUCOPHAGE-XR) 750 MG 24 hr tablet TAKE ONE TABLET BY MOUTH EVERY DAY WITH DINNER 90 tablet 1     simvastatin (ZOCOR) 20 MG tablet TAKE 1 TABLET(20 MG) BY MOUTH AT BEDTIME 90 tablet 1     No Known Allergies  Recent Labs   Lab Test 07/19/20  0929 07/18/20  1553 07/18/20  1126 07/18/20  0800 08/16/19  1349 01/16/19  1054  04/12/18  1109  03/14/17  1024  12/01/15  0903   A1C  --   --  6.1*  --  6.5* 6.6*   < > 6.4   < > 6.8*   < > 8.0*   LDL  --   --   --   --   --  75  --  75  --  87   < >  --    HDL  --   --   --   --   --   --   --   --   --  55  --   --    TRIG  --   --   --   --   --   --   --   --   --  79  --  78   ALT  --   --   --  16  --  15  --  14   < > 22  --   --    CR 0.95  --   --  1.06  --  0.95   < > 1.03   < > 0.90   < > 1.00   GFRESTIMATED 79  --   --  69  --  80   < > 71   < > 84   < > 74   GFRESTBLACK >90  --   --  80  --  >90   < > 86   < > >90   GFR Calc     < > 90   POTASSIUM 3.9  --   --  3.8  --  5.1   < > 4.1   < > 3.7   < > 3.9   TSH  --  0.49  --   --   --   --   --  0.39*  --   --   --   --     < > = values in this interval not displayed.      BP Readings from Last 3 Encounters:   07/21/20 122/64   07/19/20 125/68   08/16/19 136/78    Wt Readings from Last 3 Encounters:   07/21/20 61.2 kg (135 lb)   07/19/20 61.3 kg (135 lb 3.2 oz)   08/16/19 65.3 kg (144 lb)                    Reviewed and updated as needed this visit by Provider         Review of Systems   Constitutional, HEENT, cardiovascular, pulmonary, gi and gu systems are negative, except as otherwise noted.      Objective    /64   Pulse 60   Temp 97.8  F (36.6  C) (Tympanic)   Ht 1.778 m (5' 10\")   Wt 61.2 kg (135 lb)   SpO2 99%   BMI 19.37 kg/m    Body mass index is 19.37 kg/m .  Physical Exam   GENERAL: " healthy, alert and no distress  EYES: Eyes grossly normal to inspection, PERRL and conjunctivae and sclerae normal  HENT: ear canals and TM's normal, nose and mouth without ulcers or lesions  NECK: no adenopathy, no asymmetry, masses, or scars and thyroid normal to palpation  RESP: lungs clear to auscultation - no rales, rhonchi or wheezes  CV: regular rate and rhythm, normal S1 S2, no S3 or S4, no murmur, click or rub, no peripheral edema and peripheral pulses strong  ABDOMEN: soft, nontender, no hepatosplenomegaly, no masses and bowel sounds normal  MS: no gross musculoskeletal defects noted, no edema  SKIN: no suspicious lesions or rashes  NEURO: Normal strength and tone, mentation intact and speech normal  BACK: no CVA tenderness, no paralumbar tenderness  PSYCH: mentation appears normal, affect normal/bright  LYMPH: no cervical, supraclavicular, axillary, or inguinal adenopathy            Assessment & Plan       ICD-10-CM    1. Atypical chest pain  R07.89       stable, has normal VS, his stress echocardiogram is normal, will keep monitoring with hydration   Encouraged him to RTC in 2-3 months from routine exam/DM     FUTURE APPOINTMENTS:       - Follow-up visit in 2-3 month     No follow-ups on file.    Zhou Emmanuel MD  [facility] [place]

## 2020-07-21 NOTE — TELEPHONE ENCOUNTER
ED / Discharge Outreach Protocol    Patient Contact    Attempt # 2    Was call answered?  No.  Left message on voicemail with information to call me back.    Lucille Herbert RN, BSN  [facility] [place] [place]

## 2020-07-22 NOTE — TELEPHONE ENCOUNTER
Patient was seen for hospital follow-up yesterday.     Post Discharge Medication Reconciliation Status: unable to reconcile discharge medications due to done in clinic at [facility]. .  Nadia Johnson RN   [facility] - Triage

## 2020-09-10 ENCOUNTER — TELEPHONE (OUTPATIENT)
Dept: FAMILY MEDICINE | Facility: CLINIC | Age: 75
End: 2020-09-10

## 2020-09-10 NOTE — LETTER
September 10, 2020      Yury Noriega  [address] [place] [address]        Dear Yury,    I care about your health and have reviewed your health plan. I have reviewed your medical conditions, medication list, and lab results and am making recommendations based on this review, to better manage your health.    You are in particular need of attention regarding:  -Wellness (Physical) Visit     I am recommending that you:  -schedule a WELLNESS (Physical) APPOINTMENT with me.   I will check fasting labs the same day - nothing to eat except water and meds for 8-10 hours prior.    Here is a list of Health Maintenance topics that are due now or due soon:  Health Maintenance Due   Topic Date Due     Hepatitis B Vaccine (1 of 3 - Risk 3-dose series) 11/15/1966     Zoster (Shingles) Vaccine (1 of 2) 11/15/1997     Annual Wellness Visit  11/15/2012     AORTIC ANEURYSM SCREENING (SYSTEM ASSIGNED)  11/15/2012     Cholesterol Lab  03/14/2018     Eye Exam  06/19/2020     Kidney Microalbumin Urine Test  08/16/2020     Diabetic Foot Exam  08/16/2020     FALL RISK ASSESSMENT  08/16/2020     Flu Vaccine (1) 09/01/2020       Please call us at [phone] (or use [employer]) to address the above recommendations.       Thank you for trusting [facility] and we appreciate the opportunity to serve you.  We look forward to supporting your healthcare needs in the future.    Healthy Regards,    Zhou Emmanuel MD

## 2020-09-10 NOTE — TELEPHONE ENCOUNTER
Needs of attention regarding:  -Wellness (Physical) Visit     Health Maintenance Topics with due status: Overdue       Topic Date Due    HEPATITIS B IMMUNIZATION 11/15/1966    ZOSTER IMMUNIZATION 11/15/1997    MEDICARE ANNUAL WELLNESS VISIT 11/15/2012    AORTIC ANEURYSM SCREENING (SYSTEM ASSIGNED) 11/15/2012    LIPID 03/14/2018    EYE EXAM 06/19/2020    MICROALBUMIN 08/16/2020    DIABETIC FOOT EXAM 08/16/2020    FALL RISK ASSESSMENT 08/16/2020    INFLUENZA VACCINE 09/01/2020       Communication:  See Letter

## 2020-09-29 ENCOUNTER — TRANSFERRED RECORDS (OUTPATIENT)
Dept: HEALTH INFORMATION MANAGEMENT | Facility: CLINIC | Age: 75
End: 2020-09-29

## 2020-09-29 LAB — RETINOPATHY: NORMAL

## 2020-10-03 DIAGNOSIS — E78.2 MIXED HYPERLIPIDEMIA: ICD-10-CM

## 2020-10-05 RX ORDER — SIMVASTATIN 20 MG
TABLET ORAL
Qty: 90 TABLET | Refills: 1 | Status: SHIPPED | OUTPATIENT
Start: 2020-10-05 | End: 2021-04-01

## 2020-10-08 DIAGNOSIS — E78.2 MIXED HYPERLIPIDEMIA: ICD-10-CM

## 2020-10-08 DIAGNOSIS — E11.9 WELL CONTROLLED TYPE 2 DIABETES MELLITUS (H): ICD-10-CM

## 2020-10-09 RX ORDER — LISINOPRIL 2.5 MG/1
TABLET ORAL
Qty: 90 TABLET | Refills: 2 | Status: SHIPPED | OUTPATIENT
Start: 2020-10-09 | End: 2021-07-05

## 2020-10-09 RX ORDER — METFORMIN HYDROCHLORIDE 750 MG/1
TABLET, EXTENDED RELEASE ORAL
Qty: 90 TABLET | Refills: 0 | Status: SHIPPED | OUTPATIENT
Start: 2020-10-09 | End: 2021-01-08

## 2020-10-09 NOTE — TELEPHONE ENCOUNTER
Prescription approved per [facility] [place].  Tabitha Montgomery RN

## 2020-10-21 ENCOUNTER — OFFICE VISIT (OUTPATIENT)
Dept: FAMILY MEDICINE | Facility: CLINIC | Age: 75
End: 2020-10-21
Payer: MEDICARE

## 2020-10-21 VITALS
OXYGEN SATURATION: 98 % | DIASTOLIC BLOOD PRESSURE: 80 MMHG | HEART RATE: 78 BPM | HEIGHT: 70 IN | BODY MASS INDEX: 20.47 KG/M2 | SYSTOLIC BLOOD PRESSURE: 136 MMHG | WEIGHT: 143 LBS | TEMPERATURE: 97.6 F

## 2020-10-21 DIAGNOSIS — Z23 NEED FOR IMMUNIZATION AGAINST INFLUENZA: ICD-10-CM

## 2020-10-21 DIAGNOSIS — E11.9 WELL CONTROLLED TYPE 2 DIABETES MELLITUS (H): ICD-10-CM

## 2020-10-21 DIAGNOSIS — Z00.00 ENCOUNTER FOR MEDICARE ANNUAL WELLNESS EXAM: Primary | ICD-10-CM

## 2020-10-21 DIAGNOSIS — Z12.5 SCREENING FOR PROSTATE CANCER: ICD-10-CM

## 2020-10-21 DIAGNOSIS — E78.2 MIXED HYPERLIPIDEMIA: ICD-10-CM

## 2020-10-21 LAB
ERYTHROCYTE [DISTWIDTH] IN BLOOD BY AUTOMATED COUNT: 12.9 % (ref 10–15)
HCT VFR BLD AUTO: 43 % (ref 40–53)
HGB BLD-MCNC: 14.5 G/DL (ref 13.3–17.7)
MCH RBC QN AUTO: 29.8 PG (ref 26.5–33)
MCHC RBC AUTO-ENTMCNC: 33.7 G/DL (ref 31.5–36.5)
MCV RBC AUTO: 88 FL (ref 78–100)
PLATELET # BLD AUTO: 198 10E9/L (ref 150–450)
RBC # BLD AUTO: 4.87 10E12/L (ref 4.4–5.9)
WBC # BLD AUTO: 8.1 10E9/L (ref 4–11)

## 2020-10-21 PROCEDURE — G0008 ADMIN INFLUENZA VIRUS VAC: HCPCS | Performed by: FAMILY MEDICINE

## 2020-10-21 PROCEDURE — 80061 LIPID PANEL: CPT | Performed by: FAMILY MEDICINE

## 2020-10-21 PROCEDURE — 90662 IIV NO PRSV INCREASED AG IM: CPT | Performed by: FAMILY MEDICINE

## 2020-10-21 PROCEDURE — 99207 PR FOOT EXAM NO CHARGE: CPT | Mod: 25 | Performed by: FAMILY MEDICINE

## 2020-10-21 PROCEDURE — G0438 PPPS, INITIAL VISIT: HCPCS | Performed by: FAMILY MEDICINE

## 2020-10-21 PROCEDURE — 80053 COMPREHEN METABOLIC PANEL: CPT | Performed by: FAMILY MEDICINE

## 2020-10-21 PROCEDURE — 85027 COMPLETE CBC AUTOMATED: CPT | Performed by: FAMILY MEDICINE

## 2020-10-21 PROCEDURE — 82043 UR ALBUMIN QUANTITATIVE: CPT | Performed by: FAMILY MEDICINE

## 2020-10-21 PROCEDURE — 36415 COLL VENOUS BLD VENIPUNCTURE: CPT | Performed by: FAMILY MEDICINE

## 2020-10-21 PROCEDURE — G0103 PSA SCREENING: HCPCS | Performed by: FAMILY MEDICINE

## 2020-10-21 ASSESSMENT — MIFFLIN-ST. JEOR: SCORE: 1404.89

## 2020-10-21 NOTE — PATIENT INSTRUCTIONS
Patient Education   Personalized Prevention Plan  You are due for the preventive services outlined below.  Your care team is available to assist you in scheduling these services.  If you have already completed any of these items, please share that information with your care team to update in your medical record.  Health Maintenance Due   Topic Date Due     Hepatitis B Vaccine (1 of 3 - Risk 3-dose series) 11/15/1966     Zoster (Shingles) Vaccine (1 of 2) 11/15/1997     Annual Wellness Visit  11/15/2012     AORTIC ANEURYSM SCREENING (SYSTEM ASSIGNED)  11/15/2012     Cholesterol Lab  03/14/2018     Kidney Microalbumin Urine Test  08/16/2020     Diabetic Foot Exam  08/16/2020     FALL RISK ASSESSMENT  08/16/2020     Flu Vaccine (1) 09/01/2020

## 2020-10-21 NOTE — PROGRESS NOTES
"SUBJECTIVE:   Yury Noriega is a 74 year old male who presents for Preventive Visit.      Patient has been advised of split billing requirements and indicates understanding: Yes  Are you in the first 12 months of your Medicare Part B coverage?  No    Physical Health:    In general, how would you rate your overall physical health? excellent    Outside of work, how many days during the week do you exercise? 6-7 days/week    Outside of work, approximately how many minutes a day do you exercise?greater than 60 minutes    If you drink alcohol do you typically have >3 drinks per day or >7 drinks per week? No    Do you usually eat at least 4 servings of fruit and vegetables a day, include whole grains & fiber and avoid regularly eating high fat or \"junk\" foods? NO    Do you have any problems taking medications regularly?  No    Do you have any side effects from medications? none    Needs assistance for the following daily activities: no assistance needed    Which of the following safety concerns are present in your home?  none identified     Hearing impairment: No    In the past 6 months, have you been bothered by leaking of urine? no    Mental Health:    In general, how would you rate your overall mental or emotional health? good  PHQ-2 Score:      Do you feel safe in your environment? Yes    Have you ever done Advance Care Planning? (For example, a Health Directive, POLST, or a discussion with a medical provider or your loved ones about your wishes): Yes, patient states has an Advance Care Planning document and will bring a copy to the clinic.    Additional concerns to address?  No    Fall risk:  Fallen 2 or more times in the past year?: No  Any fall with injury in the past year?: No    Cognitive Screenin) Repeat 3 items (Leader, Season, Table)    2) Clock draw: NORMAL  3) 3 item recall: Recalls 3 objects  Results: 3 items recalled: COGNITIVE IMPAIRMENT LESS LIKELY    Mini-CogTM Copyright S Mihir. Licensed " by the author for use in [facility]; reprinted with permission (ese@[facility]). All rights reserved.      Do you have sleep apnea, excessive snoring or daytime drowsiness?: no      Reviewed and updated as needed this visit by clinical staff                 Reviewed and updated as needed this visit by Provider                Social History     Tobacco Use     Smoking status: Never Smoker     Smokeless tobacco: Never Used   Substance Use Topics     Alcohol use: Yes     Comment: rarely                           Current providers sharing in care for this patient include:   Patient Care Team:  Zhou Emmanuel MD as PCP - General (Family Practice)  Zhou Emmanuel MD as Assigned PCP    The following health maintenance items are reviewed in Epic and correct as of today:  Health Maintenance   Topic Date Due     HEPATITIS B IMMUNIZATION (1 of 3 - Risk 3-dose series) 11/15/1966     ZOSTER IMMUNIZATION (1 of 2) 11/15/1997     MEDICARE ANNUAL WELLNESS VISIT  11/15/2012     AORTIC ANEURYSM SCREENING (SYSTEM ASSIGNED)  11/15/2012     LIPID  03/14/2018     MICROALBUMIN  08/16/2020     DIABETIC FOOT EXAM  08/16/2020     FALL RISK ASSESSMENT  08/16/2020     INFLUENZA VACCINE (1) 09/01/2020     A1C  01/18/2021     ADVANCE CARE PLANNING  03/21/2021     BMP  07/19/2021     EYE EXAM  09/29/2021     COLORECTAL CANCER SCREENING  01/05/2026     DTAP/TDAP/TD IMMUNIZATION (3 - Td) 03/14/2027     HEPATITIS C SCREENING  Completed     PHQ-2  Completed     Pneumococcal Vaccine: 65+ Years  Completed     Pneumococcal Vaccine: Pediatrics (0 to 5 Years) and At-Risk Patients (6 to 64 Years)  Aged Out     IPV IMMUNIZATION  Aged Out     MENINGITIS IMMUNIZATION  Aged Out     BP Readings from Last 3 Encounters:   10/21/20 136/80   07/21/20 122/64   07/19/20 125/68    Wt Readings from Last 3 Encounters:   10/21/20 64.9 kg (143 lb)   07/21/20 61.2 kg (135 lb)   07/19/20 61.3 kg (135 lb 3.2 oz)                  Patient Active Problem List   Diagnosis      BPH (benign prostatic hyperplasia)     CARDIOVASCULAR SCREENING; LDL GOAL LESS THAN 160     Type 2 diabetes mellitus, uncontrolled (H)     Advance Care Planning     Past Surgical History:   Procedure Laterality Date     AS CLOSED RX FEMUR SHAFT FX       COLONOSCOPY N/A 1/5/2016    Procedure: COLONOSCOPY;  Surgeon: Jose Luis Nunez MD;  Location:  GI       Social History     Tobacco Use     Smoking status: Never Smoker     Smokeless tobacco: Never Used   Substance Use Topics     Alcohol use: Yes     Comment: rarely     Family History   Problem Relation Age of Onset     Diabetes Mother          Current Outpatient Medications   Medication Sig Dispense Refill     aspirin (ASA) 81 MG EC tablet Take 1 tablet (81 mg) by mouth daily       blood glucose (ACCU-CHEK DIONICIO PLUS) test strip USE TO TEST BLOOD SUGAR ONCE DAILY 100 each 1     blood glucose monitoring (ACCU-CHEK DIONICIO PLUS) meter device kit Use to test blood sugars 1 times daily or as directed. 1 kit 0     blood glucose monitoring (ACCU-CHEK FASTCLIX) lancets USE TO TEST ONCE DAILY OR AS DIRECTED 100 each 2     lisinopril (ZESTRIL) 2.5 MG tablet TAKE ONE TABLET BY MOUTH EVERY DAY 90 tablet 2     metFORMIN (GLUCOPHAGE-XR) 750 MG 24 hr tablet TAKE ONE TABLET BY MOUTH EVERY DAY WITH DINNER 90 tablet 0     simvastatin (ZOCOR) 20 MG tablet TAKE ONE TABLET BY MOUTH AT BEDTIME 90 tablet 1     No Known Allergies  Recent Labs   Lab Test 07/19/20  0929 07/18/20  1553 07/18/20  1126 07/18/20  0800 08/16/19  1349 01/16/19  1054 04/12/18  1109 04/12/18  1109 03/14/17  1024 03/14/17  1024 12/01/15  0903 12/01/15  0903   A1C  --   --  6.1*  --  6.5* 6.6*   < > 6.4   < > 6.8*   < > 8.0*   LDL  --   --   --   --   --  75  --  75  --  87   < >  --    HDL  --   --   --   --   --   --   --   --   --  55  --   --    TRIG  --   --   --   --   --   --   --   --   --  79  --  78   ALT  --   --   --  16  --  15  --  14   < > 22  --   --    CR 0.95  --   --  1.06  --  0.95   < > 1.03    "< > 0.90   < > 1.00   GFRESTIMATED 79  --   --  69  --  80   < > 71   < > 84   < > 74   GFRESTBLACK >90  --   --  80  --  >90   < > 86   < > >90   GFR Calc     < > 90   POTASSIUM 3.9  --   --  3.8  --  5.1   < > 4.1   < > 3.7   < > 3.9   TSH  --  0.49  --   --   --   --   --  0.39*  --   --   --   --     < > = values in this interval not displayed.      Pneumonia Vaccine:Adults age 65+ who received Pneumovax (PPSV23) at 65 years or older: Should be given PCV13 > 1 year after their most recent PPSV23    ROS:  Constitutional, HEENT, cardiovascular, pulmonary, gi and gu systems are negative, except as otherwise noted.    OBJECTIVE:   /80   Pulse 78   Temp 97.6  F (36.4  C) (Tympanic)   Ht 1.778 m (5' 10\")   Wt 64.9 kg (143 lb)   SpO2 98%   BMI 20.52 kg/m   Estimated body mass index is 20.52 kg/m  as calculated from the following:    Height as of this encounter: 1.778 m (5' 10\").    Weight as of this encounter: 64.9 kg (143 lb).  EXAM:   GENERAL: healthy, alert and no distress  EYES: Eyes grossly normal to inspection, PERRL and conjunctivae and sclerae normal  HENT: ear canals and TM's normal, nose and mouth without ulcers or lesions  NECK: no adenopathy, no asymmetry, masses, or scars and thyroid normal to palpation  RESP: lungs clear to auscultation - no rales, rhonchi or wheezes  CV: regular rate and rhythm, normal S1 S2, no S3 or S4, no murmur, click or rub, no peripheral edema and peripheral pulses strong  ABDOMEN: soft, nontender, no hepatosplenomegaly, no masses and bowel sounds normal  MS: no gross musculoskeletal defects noted, no edema  SKIN: no suspicious lesions or rashes  NEURO: Normal strength and tone, mentation intact and speech normal  BACK: no CVA tenderness, no paralumbar tenderness  PSYCH: mentation appears normal, affect normal/bright  LYMPH: no cervical, supraclavicular, axillary, or inguinal adenopathy  Diabetic foot exam: normal DP and PT pulses, no trophic changes or " "ulcerative lesions and normal sensory exam    Diagnostic Test Results:  Labs reviewed in Epic    ASSESSMENT / PLAN:       ICD-10-CM    1. Encounter for Medicare annual wellness exam  Z00.00 CBC with platelets     Comprehensive metabolic panel (BMP + Alb, Alk Phos, ALT, AST, Total. Bili, TP)     Lipid panel reflex to direct LDL Fasting     Albumin Random Urine Quantitative with Creat Ratio     PSA, screen     FOOT EXAM   2. Well controlled type 2 diabetes mellitus (H)  E11.9 Comprehensive metabolic panel (BMP + Alb, Alk Phos, ALT, AST, Total. Bili, TP)     Lipid panel reflex to direct LDL Fasting     Albumin Random Urine Quantitative with Creat Ratio     FOOT EXAM   3. Mixed hyperlipidemia  E78.2 Comprehensive metabolic panel (BMP + Alb, Alk Phos, ALT, AST, Total. Bili, TP)     Lipid panel reflex to direct LDL Fasting     Albumin Random Urine Quantitative with Creat Ratio   4. Screening for prostate cancer  Z12.5 PSA, screen   5. Need for immunization against influenza  Z23 FLUZONE HIGH DOSE 65+  [56094]       Patient has been advised of split billing requirements and indicates understanding: Yes    COUNSELING:  Reviewed preventive health counseling, as reflected in patient instructions    Estimated body mass index is 19.37 kg/m  as calculated from the following:    Height as of 7/21/20: 1.778 m (5' 10\").    Weight as of 7/21/20: 61.2 kg (135 lb).        He reports that he has never smoked. He has never used smokeless tobacco.    Appropriate preventive services were discussed with this patient, including applicable screening as appropriate for cardiovascular disease, diabetes, osteopenia/osteoporosis, and glaucoma.  As appropriate for age/gender, discussed screening for colorectal cancer, prostate cancer, breast cancer, and cervical cancer. Checklist reviewing preventive services available has been given to the patient.    Reviewed patients plan of care and provided an AVS. The Basic Care Plan (routine screening as " documented in Health Maintenance) for Yury meets the Care Plan requirement. This Care Plan has been established and reviewed with the Patient.    Counseling Resources:  ATP IV Guidelines  Pooled Cohorts Equation Calculator  Breast Cancer Risk Calculator  BRCA-Related Cancer Risk Assessment: FHS-7 Tool  FRAX Risk Assessment  ICSI Preventive Guidelines  Dietary Guidelines for Americans, 2010  USDA's MyPlate  ASA Prophylaxis  Lung CA Screening    Zhou Emmanuel MD  [facility]

## 2020-10-22 LAB
ALBUMIN SERPL-MCNC: 4.1 G/DL (ref 3.4–5)
ALP SERPL-CCNC: 78 U/L (ref 40–150)
ALT SERPL W P-5'-P-CCNC: 19 U/L (ref 0–70)
ANION GAP SERPL CALCULATED.3IONS-SCNC: 6 MMOL/L (ref 3–14)
AST SERPL W P-5'-P-CCNC: 15 U/L (ref 0–45)
BILIRUB SERPL-MCNC: 0.4 MG/DL (ref 0.2–1.3)
BUN SERPL-MCNC: 31 MG/DL (ref 7–30)
CALCIUM SERPL-MCNC: 9.4 MG/DL (ref 8.5–10.1)
CHLORIDE SERPL-SCNC: 108 MMOL/L (ref 94–109)
CHOLEST SERPL-MCNC: 152 MG/DL
CO2 SERPL-SCNC: 25 MMOL/L (ref 20–32)
CREAT SERPL-MCNC: 1.12 MG/DL (ref 0.66–1.25)
CREAT UR-MCNC: 231 MG/DL
GFR SERPL CREATININE-BSD FRML MDRD: 65 ML/MIN/{1.73_M2}
GLUCOSE SERPL-MCNC: 137 MG/DL (ref 70–99)
HDLC SERPL-MCNC: 61 MG/DL
LDLC SERPL CALC-MCNC: 75 MG/DL
MICROALBUMIN UR-MCNC: 93 MG/L
MICROALBUMIN/CREAT UR: 40.22 MG/G CR (ref 0–17)
NONHDLC SERPL-MCNC: 91 MG/DL
POTASSIUM SERPL-SCNC: 4.7 MMOL/L (ref 3.4–5.3)
PROT SERPL-MCNC: 7.4 G/DL (ref 6.8–8.8)
PSA SERPL-ACNC: 1.44 UG/L (ref 0–4)
SODIUM SERPL-SCNC: 139 MMOL/L (ref 133–144)
TRIGL SERPL-MCNC: 79 MG/DL

## 2021-01-08 DIAGNOSIS — E11.9 WELL CONTROLLED TYPE 2 DIABETES MELLITUS (H): ICD-10-CM

## 2021-01-08 DIAGNOSIS — E78.2 MIXED HYPERLIPIDEMIA: ICD-10-CM

## 2021-01-08 RX ORDER — METFORMIN HYDROCHLORIDE 750 MG/1
TABLET, EXTENDED RELEASE ORAL
Qty: 90 TABLET | Refills: 0 | Status: SHIPPED | OUTPATIENT
Start: 2021-01-08 | End: 2021-04-01

## 2021-01-08 NOTE — TELEPHONE ENCOUNTER
Prescription approved per [facility].    Maddie BOURGEOIS RN  EP Triage

## 2021-03-12 DIAGNOSIS — E11.9 TYPE 2 DIABETES MELLITUS WITHOUT COMPLICATION, UNSPECIFIED WHETHER LONG TERM INSULIN USE (H): ICD-10-CM

## 2021-03-12 DIAGNOSIS — E11.65 TYPE 2 DIABETES MELLITUS WITH HYPERGLYCEMIA (H): ICD-10-CM

## 2021-03-12 DIAGNOSIS — E78.2 MIXED HYPERLIPIDEMIA: ICD-10-CM

## 2021-03-14 ENCOUNTER — HEALTH MAINTENANCE LETTER (OUTPATIENT)
Age: 76
End: 2021-03-14

## 2021-03-14 RX ORDER — LANCETS
EACH MISCELLANEOUS
Qty: 102 EACH | Refills: 2 | Status: SHIPPED | OUTPATIENT
Start: 2021-03-14 | End: 2022-02-02

## 2021-03-14 RX ORDER — BLOOD SUGAR DIAGNOSTIC
STRIP MISCELLANEOUS
Qty: 100 STRIP | Refills: 1 | Status: SHIPPED | OUTPATIENT
Start: 2021-03-14 | End: 2021-10-04

## 2021-07-05 DIAGNOSIS — E11.9 WELL CONTROLLED TYPE 2 DIABETES MELLITUS (H): ICD-10-CM

## 2021-07-05 DIAGNOSIS — E78.2 MIXED HYPERLIPIDEMIA: ICD-10-CM

## 2021-07-05 RX ORDER — LISINOPRIL 2.5 MG/1
TABLET ORAL
Qty: 90 TABLET | Refills: 0 | Status: SHIPPED | OUTPATIENT
Start: 2021-07-05 | End: 2021-07-07

## 2021-07-05 NOTE — TELEPHONE ENCOUNTER
Failed protocol.  please route to  team if patient needs an appointment     Chasity BRYANTRN BSN  [facility]  [phone]

## 2021-07-07 ENCOUNTER — VIRTUAL VISIT (OUTPATIENT)
Dept: FAMILY MEDICINE | Facility: CLINIC | Age: 76
End: 2021-07-07
Payer: MEDICARE

## 2021-07-07 DIAGNOSIS — E11.9 WELL CONTROLLED TYPE 2 DIABETES MELLITUS (H): ICD-10-CM

## 2021-07-07 DIAGNOSIS — E78.2 MIXED HYPERLIPIDEMIA: Primary | ICD-10-CM

## 2021-07-07 PROCEDURE — 99443 PR PHYSICIAN TELEPHONE EVALUATION 21-30 MIN: CPT | Mod: 95 | Performed by: FAMILY MEDICINE

## 2021-07-07 RX ORDER — METFORMIN HYDROCHLORIDE 750 MG/1
TABLET, EXTENDED RELEASE ORAL
Qty: 90 TABLET | Refills: 1 | Status: SHIPPED | OUTPATIENT
Start: 2021-07-07 | End: 2021-12-15

## 2021-07-07 RX ORDER — LISINOPRIL 2.5 MG/1
2.5 TABLET ORAL DAILY
Qty: 90 TABLET | Refills: 1 | Status: SHIPPED | OUTPATIENT
Start: 2021-07-07 | End: 2021-10-04

## 2021-07-07 NOTE — PROGRESS NOTES
Yury is a 75 year old who is being evaluated via a billable telephone visit.      What phone number would you like to be contacted at? [phone]  How would you like to obtain your AVS? MyChart    Assessment & Plan     Mixed hyperlipidemia  Stable, keep monitoring   - REVIEW OF HEALTH MAINTENANCE PROTOCOL ORDERS  - **A1C FUTURE anytime; Future  - **Basic metabolic panel FUTURE anytime; Future  - **ALT FUTURE 1yr; Future  - LDL cholesterol direct; Future  - metFORMIN (GLUCOPHAGE-XR) 750 MG 24 hr tablet; TAKE ONE TABLET BY MOUTH EVERY DAY WITH DINNER    Well controlled type 2 diabetes mellitus (H)  Stable, keep monitoring, will review the lab results and update pt   - REVIEW OF HEALTH MAINTENANCE PROTOCOL ORDERS  - **A1C FUTURE anytime; Future  - **Basic metabolic panel FUTURE anytime; Future  - **ALT FUTURE 1yr; Future  - LDL cholesterol direct; Future  - metFORMIN (GLUCOPHAGE-XR) 750 MG 24 hr tablet; TAKE ONE TABLET BY MOUTH EVERY DAY WITH DINNER  - lisinopril (ZESTRIL) 2.5 MG tablet; Take 1 tablet (2.5 mg) by mouth daily           FUTURE APPOINTMENTS:       - Follow-up visit in 6 months for CPE    Return in about 6 months (around 1/7/2022) for Lab Work, Physical Exam, BP Recheck, DM recheck, Routine Visit.    Zhou Emmanuel MD  [facility]    Danni Cotton is a 75 year old who presents for the following health issues     HPI     Diabetes Follow-up    How often are you checking your blood sugar? One time daily  What time of day are you checking your blood sugars (select all that apply)?  Before meals  Have you had any blood sugars above 200?  No  Have you had any blood sugars below 70?  No    What symptoms do you notice when your blood sugar is low?  None    What concerns do you have today about your diabetes? None     Do you have any of these symptoms? (Select all that apply)  No numbness or tingling in feet.  No redness, sores or blisters on feet.  No complaints of excessive thirst.   No reports of blurry vision.  No significant changes to weight.      BP Readings from Last 2 Encounters:   10/21/20 136/80   07/21/20 122/64     Hemoglobin A1C (%)   Date Value   07/18/2020 6.1 (H)   08/16/2019 6.5 (H)     LDL Cholesterol Calculated (mg/dL)   Date Value   10/21/2020 75   03/14/2017 87     LDL Cholesterol Direct (mg/dL)   Date Value   01/16/2019 75   04/12/2018 75                 How many servings of fruits and vegetables do you eat daily?  0-1    On average, how many sweetened beverages do you drink each day (Examples: soda, juice, sweet tea, etc.  Do NOT count diet or artificially sweetened beverages)?   0    How many days per week do you exercise enough to make your heart beat faster? 7    How many minutes a day do you exercise enough to make your heart beat faster? 30 - 60    How many days per week do you miss taking your medication? 0        Review of Systems   Constitutional, HEENT, cardiovascular, pulmonary, gi and gu systems are negative, except as otherwise noted.      Objective           Vitals:  No vitals were obtained today due to virtual visit.    Physical Exam   healthy, alert and no distress  PSYCH: Alert and oriented times 3; coherent speech, normal   rate and volume, able to articulate logical thoughts, able   to abstract reason, no tangential thoughts, no hallucinations   or delusions  His affect is normal  RESP: No cough, no audible wheezing, able to talk in full sentences  Remainder of exam unable to be completed due to telephone visits                Phone call duration: 21 minutes

## 2021-07-08 RX ORDER — METFORMIN HYDROCHLORIDE 750 MG/1
TABLET, EXTENDED RELEASE ORAL
Qty: 90 TABLET | Refills: 0 | OUTPATIENT
Start: 2021-07-08

## 2021-07-08 NOTE — TELEPHONE ENCOUNTER
Contacted patient and he had appointment with provider yesterday 07/07/21.    Routing to RN to cancel refill.    Justine GODOY CMA

## 2021-07-15 ENCOUNTER — LAB (OUTPATIENT)
Dept: LAB | Facility: CLINIC | Age: 76
End: 2021-07-15
Payer: MEDICARE

## 2021-07-15 DIAGNOSIS — E11.9 WELL CONTROLLED TYPE 2 DIABETES MELLITUS (H): ICD-10-CM

## 2021-07-15 DIAGNOSIS — E78.2 MIXED HYPERLIPIDEMIA: ICD-10-CM

## 2021-07-15 LAB — HBA1C MFR BLD: 6.3 % (ref 0–5.6)

## 2021-07-15 PROCEDURE — 80048 BASIC METABOLIC PNL TOTAL CA: CPT

## 2021-07-15 PROCEDURE — 36415 COLL VENOUS BLD VENIPUNCTURE: CPT

## 2021-07-15 PROCEDURE — 83036 HEMOGLOBIN GLYCOSYLATED A1C: CPT

## 2021-07-15 PROCEDURE — 84460 ALANINE AMINO (ALT) (SGPT): CPT

## 2021-07-15 PROCEDURE — 83721 ASSAY OF BLOOD LIPOPROTEIN: CPT

## 2021-07-16 LAB
ALT SERPL W P-5'-P-CCNC: 21 U/L (ref 0–70)
ANION GAP SERPL CALCULATED.3IONS-SCNC: 10 MMOL/L (ref 3–14)
BUN SERPL-MCNC: 19 MG/DL (ref 7–30)
CALCIUM SERPL-MCNC: 9.5 MG/DL (ref 8.5–10.1)
CHLORIDE BLD-SCNC: 103 MMOL/L (ref 94–109)
CO2 SERPL-SCNC: 24 MMOL/L (ref 20–32)
CREAT SERPL-MCNC: 1.2 MG/DL (ref 0.66–1.25)
GFR SERPL CREATININE-BSD FRML MDRD: 60 ML/MIN/1.73M2
GLUCOSE BLD-MCNC: 125 MG/DL (ref 70–99)
LDLC SERPL CALC-MCNC: 87 MG/DL
POTASSIUM BLD-SCNC: 4.5 MMOL/L (ref 3.4–5.3)
SODIUM SERPL-SCNC: 137 MMOL/L (ref 133–144)

## 2021-09-30 DIAGNOSIS — E11.9 WELL CONTROLLED TYPE 2 DIABETES MELLITUS (H): ICD-10-CM

## 2021-09-30 DIAGNOSIS — E78.2 MIXED HYPERLIPIDEMIA: ICD-10-CM

## 2021-09-30 DIAGNOSIS — E11.9 TYPE 2 DIABETES MELLITUS WITHOUT COMPLICATION, UNSPECIFIED WHETHER LONG TERM INSULIN USE (H): ICD-10-CM

## 2021-10-04 RX ORDER — BLOOD SUGAR DIAGNOSTIC
STRIP MISCELLANEOUS
Qty: 100 STRIP | Refills: 1 | Status: SHIPPED | OUTPATIENT
Start: 2021-10-04 | End: 2022-05-17

## 2021-10-04 RX ORDER — LISINOPRIL 2.5 MG/1
TABLET ORAL
Qty: 90 TABLET | Refills: 1 | Status: SHIPPED | OUTPATIENT
Start: 2021-10-04 | End: 2022-04-01

## 2021-10-04 RX ORDER — SIMVASTATIN 20 MG
TABLET ORAL
Qty: 90 TABLET | Refills: 1 | Status: SHIPPED | OUTPATIENT
Start: 2021-10-04 | End: 2022-04-01

## 2021-10-18 ENCOUNTER — TRANSFERRED RECORDS (OUTPATIENT)
Dept: HEALTH INFORMATION MANAGEMENT | Facility: CLINIC | Age: 76
End: 2021-10-18
Payer: MEDICARE

## 2021-10-18 LAB — RETINOPATHY: NEGATIVE

## 2021-10-24 ENCOUNTER — HEALTH MAINTENANCE LETTER (OUTPATIENT)
Age: 76
End: 2021-10-24

## 2021-10-25 ENCOUNTER — TELEPHONE (OUTPATIENT)
Dept: FAMILY MEDICINE | Facility: CLINIC | Age: 76
End: 2021-10-25
Payer: MEDICARE

## 2021-10-25 NOTE — TELEPHONE ENCOUNTER
Patient Quality Outreach      Summary:    Patient has the following on his problem list/HM: None    Patient is due/failing the following:   Physical     Type of outreach:    Sent letter.    Questions for provider review:    None                                                                                                                                     Lindsey Mason MA      Chart routed to Care Team.

## 2021-10-25 NOTE — LETTER
October 25, 2021      Yury Noriega  [address] [place] [address]        Dear Yury,    I care about your health and have reviewed your health plan. I have reviewed your medical conditions, medication list, and lab results and am making recommendations based on this review, to better manage your health.    You are in particular need of attention regarding:  -Wellness (Physical) Visit     I am recommending that you:  -schedule a WELLNESS (Physical) APPOINTMENT with me.   I will check fasting labs the same day - nothing to eat except water and meds for 8-10 hours prior.    Here is a list of Health Maintenance topics that are due now or due soon:  Health Maintenance Due   Topic Date Due     Zoster (Shingles) Vaccine (1 of 2) Never done     AORTIC ANEURYSM SCREENING (SYSTEM ASSIGNED)  Never done     PHQ-2  01/01/2021     Flu Vaccine (1) 09/01/2021     Eye Exam  09/29/2021     Cholesterol Lab  10/21/2021     Kidney Microalbumin Urine Test  10/21/2021     Diabetic Foot Exam  10/21/2021     FALL RISK ASSESSMENT  10/21/2021     Annual Wellness Visit  10/21/2021       Please call us at [phone] (or use [employer]) to address the above recommendations.     Thank you for trusting [facility] and we appreciate the opportunity to serve you.  We look forward to supporting your healthcare needs in the future.    Healthy Regards,    Zhou Emmanuel MD

## 2021-11-10 ENCOUNTER — PATIENT OUTREACH (OUTPATIENT)
Dept: FAMILY MEDICINE | Facility: CLINIC | Age: 76
End: 2021-11-10
Payer: MEDICARE

## 2021-11-10 NOTE — LETTER
November 10, 2021      Yury Noriega  [address] [place] [address]        Dear Yury,    I care about your health and have reviewed your health plan. I have reviewed your medical conditions, medication list, and lab results and am making recommendations based on this review, to better manage your health.    You are in particular need of attention regarding:  -Wellness (Physical) Visit     I am recommending that you:  -schedule a WELLNESS (Physical) APPOINTMENT with me.   I will check fasting labs the same day - nothing to eat except water and meds for 8-10 hours prior.    Here is a list of Health Maintenance topics that are due now or due soon:  Health Maintenance Due   Topic Date Due     Zoster (Shingles) Vaccine (1 of 2) Never done     AORTIC ANEURYSM SCREENING (SYSTEM ASSIGNED)  Never done     PHQ-2  01/01/2021     Flu Vaccine (1) 09/01/2021     Eye Exam  09/29/2021     Cholesterol Lab  10/21/2021     Kidney Microalbumin Urine Test  10/21/2021     Diabetic Foot Exam  10/21/2021     FALL RISK ASSESSMENT  10/21/2021     Annual Wellness Visit  10/21/2021     COVID-19 Vaccine (3 - Booster for Moderna series) 11/13/2021       Please call us at [phone] (or use [employer]) to address the above recommendations.     Thank you for trusting [facility] and we appreciate the opportunity to serve you.  We look forward to supporting your healthcare needs in the future.    Healthy Regards,    Zhou Emmanuel MD

## 2021-11-10 NOTE — TELEPHONE ENCOUNTER
Patient Quality Outreach    Patient is due for the following:   Physical     NEXT STEPS:   No follow up needed at this time.    Type of outreach:    Sent letter.      Questions for provider review:         Lindsey Mason CMA

## 2021-12-15 ENCOUNTER — OFFICE VISIT (OUTPATIENT)
Dept: FAMILY MEDICINE | Facility: CLINIC | Age: 76
End: 2021-12-15
Payer: MEDICARE

## 2021-12-15 VITALS
OXYGEN SATURATION: 98 % | DIASTOLIC BLOOD PRESSURE: 78 MMHG | BODY MASS INDEX: 21.82 KG/M2 | HEIGHT: 68 IN | WEIGHT: 144 LBS | SYSTOLIC BLOOD PRESSURE: 128 MMHG | RESPIRATION RATE: 16 BRPM | HEART RATE: 68 BPM | TEMPERATURE: 96.5 F

## 2021-12-15 DIAGNOSIS — Z12.5 SCREENING FOR PROSTATE CANCER: ICD-10-CM

## 2021-12-15 DIAGNOSIS — E11.9 TYPE 2 DIABETES MELLITUS WITHOUT COMPLICATION, UNSPECIFIED WHETHER LONG TERM INSULIN USE (H): ICD-10-CM

## 2021-12-15 DIAGNOSIS — Z23 HIGH PRIORITY FOR 2019-NCOV VACCINE: ICD-10-CM

## 2021-12-15 DIAGNOSIS — Z00.00 HEALTH MAINTENANCE EXAMINATION: Primary | ICD-10-CM

## 2021-12-15 DIAGNOSIS — E78.2 MIXED HYPERLIPIDEMIA: ICD-10-CM

## 2021-12-15 DIAGNOSIS — E11.9 WELL CONTROLLED TYPE 2 DIABETES MELLITUS (H): ICD-10-CM

## 2021-12-15 LAB
ALBUMIN SERPL-MCNC: 3.9 G/DL (ref 3.4–5)
ALP SERPL-CCNC: 70 U/L (ref 40–150)
ALT SERPL W P-5'-P-CCNC: 19 U/L (ref 0–70)
ANION GAP SERPL CALCULATED.3IONS-SCNC: 4 MMOL/L (ref 3–14)
AST SERPL W P-5'-P-CCNC: 14 U/L (ref 0–45)
BILIRUB SERPL-MCNC: 0.4 MG/DL (ref 0.2–1.3)
BUN SERPL-MCNC: 22 MG/DL (ref 7–30)
CALCIUM SERPL-MCNC: 9.5 MG/DL (ref 8.5–10.1)
CHLORIDE BLD-SCNC: 109 MMOL/L (ref 94–109)
CO2 SERPL-SCNC: 26 MMOL/L (ref 20–32)
CREAT SERPL-MCNC: 1.03 MG/DL (ref 0.66–1.25)
ERYTHROCYTE [DISTWIDTH] IN BLOOD BY AUTOMATED COUNT: 13.4 % (ref 10–15)
GFR SERPL CREATININE-BSD FRML MDRD: 71 ML/MIN/1.73M2
GLUCOSE BLD-MCNC: 140 MG/DL (ref 70–99)
HBA1C MFR BLD: 6.7 % (ref 0–5.6)
HCT VFR BLD AUTO: 43.5 % (ref 40–53)
HGB BLD-MCNC: 14.4 G/DL (ref 13.3–17.7)
MCH RBC QN AUTO: 29.4 PG (ref 26.5–33)
MCHC RBC AUTO-ENTMCNC: 33.1 G/DL (ref 31.5–36.5)
MCV RBC AUTO: 89 FL (ref 78–100)
PLATELET # BLD AUTO: 225 10E3/UL (ref 150–450)
POTASSIUM BLD-SCNC: 4.6 MMOL/L (ref 3.4–5.3)
PROT SERPL-MCNC: 7.6 G/DL (ref 6.8–8.8)
PSA SERPL-MCNC: 1.3 UG/L (ref 0–4)
RBC # BLD AUTO: 4.89 10E6/UL (ref 4.4–5.9)
SODIUM SERPL-SCNC: 139 MMOL/L (ref 133–144)
WBC # BLD AUTO: 6 10E3/UL (ref 4–11)

## 2021-12-15 PROCEDURE — 83036 HEMOGLOBIN GLYCOSYLATED A1C: CPT | Performed by: FAMILY MEDICINE

## 2021-12-15 PROCEDURE — 80061 LIPID PANEL: CPT | Performed by: FAMILY MEDICINE

## 2021-12-15 PROCEDURE — 91306 COVID-19,PF,MODERNA (18+ YRS BOOSTER .25ML): CPT | Performed by: FAMILY MEDICINE

## 2021-12-15 PROCEDURE — 85027 COMPLETE CBC AUTOMATED: CPT | Performed by: FAMILY MEDICINE

## 2021-12-15 PROCEDURE — 36415 COLL VENOUS BLD VENIPUNCTURE: CPT | Performed by: FAMILY MEDICINE

## 2021-12-15 PROCEDURE — G0439 PPPS, SUBSEQ VISIT: HCPCS | Performed by: FAMILY MEDICINE

## 2021-12-15 PROCEDURE — 0064A COVID-19,PF,MODERNA (18+ YRS BOOSTER .25ML): CPT | Performed by: FAMILY MEDICINE

## 2021-12-15 PROCEDURE — 80053 COMPREHEN METABOLIC PANEL: CPT | Performed by: FAMILY MEDICINE

## 2021-12-15 PROCEDURE — 82043 UR ALBUMIN QUANTITATIVE: CPT | Performed by: FAMILY MEDICINE

## 2021-12-15 PROCEDURE — G0103 PSA SCREENING: HCPCS | Performed by: FAMILY MEDICINE

## 2021-12-15 RX ORDER — METFORMIN HYDROCHLORIDE 750 MG/1
TABLET, EXTENDED RELEASE ORAL
Qty: 90 TABLET | Refills: 1 | Status: SHIPPED | OUTPATIENT
Start: 2021-12-15 | End: 2022-07-01

## 2021-12-15 ASSESSMENT — ENCOUNTER SYMPTOMS
HEMATURIA: 0
DIARRHEA: 0
CHILLS: 0
CONSTIPATION: 0
DIZZINESS: 0
EYE PAIN: 0
FEVER: 0
ABDOMINAL PAIN: 0
COUGH: 0
HEMATOCHEZIA: 0
NERVOUS/ANXIOUS: 0

## 2021-12-15 ASSESSMENT — MIFFLIN-ST. JEOR: SCORE: 1367.68

## 2021-12-15 ASSESSMENT — ACTIVITIES OF DAILY LIVING (ADL): CURRENT_FUNCTION: NO ASSISTANCE NEEDED

## 2021-12-15 ASSESSMENT — PAIN SCALES - GENERAL: PAINLEVEL: NO PAIN (0)

## 2021-12-15 NOTE — PROGRESS NOTES
"SUBJECTIVE:   Yury Noriega is a 76 year old male who presents for Preventive Visit.      Patient has been advised of split billing requirements and indicates understanding: Yes   Are you in the first 12 months of your Medicare coverage?  No    Healthy Habits:     In general, how would you rate your overall health?  Good    Frequency of exercise:  6-7 days/week    Duration of exercise:  Greater than 60 minutes    Do you usually eat at least 4 servings of fruit and vegetables a day, include whole grains    & fiber and avoid regularly eating high fat or \"junk\" foods?  Yes    Taking medications regularly:  Yes    Medication side effects:  None    Ability to successfully perform activities of daily living:  No assistance needed    Home Safety:  No safety concerns identified    Hearing Impairment:  No hearing concerns    In the past 6 months, have you been bothered by leaking of urine?  No    In general, how would you rate your overall mental or emotional health?  Excellent      PHQ-2 Total Score: 0    Additional concerns today:  No    Do you feel safe in your environment? Yes    Have you ever done Advance Care Planning? (For example, a Health Directive, POLST, or a discussion with a medical provider or your loved ones about your wishes): Yes, advance care planning is on file.       Fall risk  Fallen 2 or more times in the past year?: No  Any fall with injury in the past year?: No  click delete button to remove this line now  Cognitive Screening   1) Repeat 3 items (Leader, Season, Table)    2) Clock draw: NORMAL  3) 3 item recall: Recalls 3 objects  Results: 3 items recalled: COGNITIVE IMPAIRMENT LESS LIKELY    Mini-CogTM Copyright [employer]. Licensed by the author for use in [facility]; reprinted with permission (ese@.[facility]). All rights reserved.      Do you have sleep apnea, excessive snoring or daytime drowsiness?: no    Reviewed and updated as needed this visit by clinical staff   Allergies  " Meds             Reviewed and updated as needed this visit by Provider               Social History     Tobacco Use     Smoking status: Never Smoker     Smokeless tobacco: Never Used   Substance Use Topics     Alcohol use: Yes     Comment: rarely     If you drink alcohol do you typically have >3 drinks per day or >7 drinks per week? No    Alcohol Use 12/15/2021   Prescreen: >3 drinks/day or >7 drinks/week? No         Current providers sharing in care for this patient include:   Patient Care Team:  Zhou Emmanuel MD as PCP - General (Family Practice)  Zhou Emmanuel MD as Assigned PCP    The following health maintenance items are reviewed in Epic and correct as of today:  Health Maintenance Due   Topic Date Due     ZOSTER IMMUNIZATION (1 of 2) Never done     AORTIC ANEURYSM SCREENING (SYSTEM ASSIGNED)  Never done     MEDICARE ANNUAL WELLNESS VISIT  10/21/2021     LIPID  10/21/2021     MICROALBUMIN  10/21/2021     DIABETIC FOOT EXAM  10/21/2021     FALL RISK ASSESSMENT  10/21/2021     COVID-19 Vaccine (3 - Booster for Moderna series) 11/13/2021     BP Readings from Last 3 Encounters:   12/15/21 128/78   10/21/20 136/80   07/21/20 122/64    Wt Readings from Last 3 Encounters:   12/15/21 65.3 kg (144 lb)   10/21/20 64.9 kg (143 lb)   07/21/20 61.2 kg (135 lb)                  Patient Active Problem List   Diagnosis     BPH (benign prostatic hyperplasia)     CARDIOVASCULAR SCREENING; LDL GOAL LESS THAN 160     Advance Care Planning     Past Surgical History:   Procedure Laterality Date     AS CLOSED RX FEMUR SHAFT FX       COLONOSCOPY N/A 1/5/2016    Procedure: COLONOSCOPY;  Surgeon: Jose Luis Nunez MD;  Location:  GI       Social History     Tobacco Use     Smoking status: Never Smoker     Smokeless tobacco: Never Used   Substance Use Topics     Alcohol use: Yes     Comment: rarely     Family History   Problem Relation Age of Onset     Diabetes Mother          Current Outpatient Medications   Medication Sig Dispense  Refill     ACCU-CHEK DIONICIO PLUS test strip USE TO TEST BLOOD SUGAR ONCE DAILY 100 strip 1     aspirin (ASA) 81 MG EC tablet Take 1 tablet (81 mg) by mouth daily       blood glucose monitoring (ACCU-CHEK DIONICIO PLUS) meter device kit Use to test blood sugars 1 times daily or as directed. 1 kit 0     blood glucose monitoring (ACCU-CHEK FASTCLIX) lancets USE TO TEST ONCE DAILY 102 each 2     lisinopril (ZESTRIL) 2.5 MG tablet TAKE ONE TABLET BY MOUTH EVERY DAY 90 tablet 1     metFORMIN (GLUCOPHAGE-XR) 750 MG 24 hr tablet TAKE ONE TABLET BY MOUTH EVERY DAY WITH DINNER 90 tablet 1     simvastatin (ZOCOR) 20 MG tablet TAKE ONE TABLET BY MOUTH AT BEDTIME 90 tablet 1     No Known Allergies  Recent Labs   Lab Test 07/15/21  1136 07/15/21  1109 10/21/20  1114 07/19/20  0929 07/18/20  1553 07/18/20  1126 07/18/20  0800 08/16/19  1349 01/16/19  1054 09/27/18  1020 04/12/18  1109 09/13/17  1359 03/14/17  1024 02/09/16  1206 12/01/15  0903   A1C 6.3*  --   --   --   --  6.1*  --  6.5* 6.6*   < > 6.4   < > 6.8*   < > 8.0*   LDL  --  87 75  --   --   --   --   --  75  --  75  --  87   < >  --    HDL  --   --  61  --   --   --   --   --   --   --   --   --  55  --   --    TRIG  --   --  79  --   --   --   --   --   --   --   --   --  79  --  78   ALT 21  --  19  --   --   --  16  --  15  --  14   < > 22  --   --    CR 1.20  --  1.12 0.95  --   --  1.06  --  0.95   < > 1.03   < > 0.90   < > 1.00   GFRESTIMATED 60*  --  65 79  --   --  69  --  80   < > 71   < > 84   < > 74   GFRESTBLACK  --   --  75 >90  --   --  80  --  >90   < > 86   < > >90   GFR Calc     < > 90   POTASSIUM 4.5  --  4.7 3.9  --   --  3.8  --  5.1   < > 4.1   < > 3.7   < > 3.9   TSH  --   --   --   --  0.49  --   --   --   --   --  0.39*  --   --   --   --     < > = values in this interval not displayed.      Pneumonia Vaccine:Adults age 65+ who received Pneumovax (PPSV23) at 65 years or older: Should be given PCV13 > 1 year after their most recent  "PPSV23        Review of Systems   Constitutional: Negative for chills and fever.   HENT: Negative for congestion and ear pain.    Eyes: Negative for pain.   Respiratory: Negative for cough.    Cardiovascular: Negative for chest pain.   Gastrointestinal: Negative for abdominal pain, constipation, diarrhea and hematochezia.   Genitourinary: Negative for hematuria.   Neurological: Negative for dizziness.   Psychiatric/Behavioral: The patient is not nervous/anxious.          OBJECTIVE:   /78   Pulse 68   Temp (!) 96.5  F (35.8  C) (Tympanic)   Resp 16   Ht 1.727 m (5' 8\")   Wt 65.3 kg (144 lb)   SpO2 98%   BMI 21.90 kg/m   Estimated body mass index is 21.9 kg/m  as calculated from the following:    Height as of this encounter: 1.727 m (5' 8\").    Weight as of this encounter: 65.3 kg (144 lb).  Physical Exam  GENERAL: healthy, alert and no distress  EYES: Eyes grossly normal to inspection, PERRL and conjunctivae and sclerae normal  HENT: ear canals and TM's normal, nose and mouth without ulcers or lesions  NECK: no adenopathy, no asymmetry, masses, or scars and thyroid normal to palpation  RESP: lungs clear to auscultation - no rales, rhonchi or wheezes  CV: regular rate and rhythm, normal S1 S2, no S3 or S4, no murmur, click or rub, no peripheral edema and peripheral pulses strong  ABDOMEN: soft, nontender, no hepatosplenomegaly, no masses and bowel sounds normal  MS: no gross musculoskeletal defects noted, no edema  SKIN: no suspicious lesions or rashes  NEURO: Normal strength and tone, mentation intact and speech normal  BACK: no CVA tenderness, no paralumbar tenderness  PSYCH: mentation appears normal, affect normal/bright  LYMPH: no cervical, supraclavicular, axillary, or inguinal adenopathy        ASSESSMENT / PLAN:       ICD-10-CM    1. Health maintenance examination  Z00.00 CBC with platelets     Comprehensive metabolic panel (BMP + Alb, Alk Phos, ALT, AST, Total. Bili, TP)     Lipid panel reflex to " "direct LDL Fasting     PSA, screen     Hemoglobin A1c     Albumin Random Urine Quantitative with Creat Ratio     FOOT EXAM   2. Screening for prostate cancer  Z12.5 PSA, screen   3. Mixed hyperlipidemia  E78.2 Comprehensive metabolic panel (BMP + Alb, Alk Phos, ALT, AST, Total. Bili, TP)     Lipid panel reflex to direct LDL Fasting     Hemoglobin A1c     Albumin Random Urine Quantitative with Creat Ratio     metFORMIN (GLUCOPHAGE-XR) 750 MG 24 hr tablet   4. Type 2 diabetes mellitus without complication, unspecified whether long term insulin use (H)  E11.9 Comprehensive metabolic panel (BMP + Alb, Alk Phos, ALT, AST, Total. Bili, TP)     Lipid panel reflex to direct LDL Fasting     Hemoglobin A1c     Albumin Random Urine Quantitative with Creat Ratio     FOOT EXAM   5. Well controlled type 2 diabetes mellitus (H)  E11.9 metFORMIN (GLUCOPHAGE-XR) 750 MG 24 hr tablet       Patient has been advised of split billing requirements and indicates understanding: Yes  COUNSELING:  Reviewed preventive health counseling, as reflected in patient instructions    Estimated body mass index is 21.9 kg/m  as calculated from the following:    Height as of this encounter: 1.727 m (5' 8\").    Weight as of this encounter: 65.3 kg (144 lb).        He reports that he has never smoked. He has never used smokeless tobacco.      Appropriate preventive services were discussed with this patient, including applicable screening as appropriate for cardiovascular disease, diabetes, osteopenia/osteoporosis, and glaucoma.  As appropriate for age/gender, discussed screening for colorectal cancer, prostate cancer, breast cancer, and cervical cancer. Checklist reviewing preventive services available has been given to the patient.    Reviewed patients plan of care and provided an AVS. The Basic Care Plan (routine screening as documented in Health Maintenance) for Yury meets the Care Plan requirement. This Care Plan has been established and reviewed with " the Patient.    Counseling Resources:  ATP IV Guidelines  Pooled Cohorts Equation Calculator  Breast Cancer Risk Calculator  Breast Cancer: Medication to Reduce Risk  FRAX Risk Assessment  ICSI Preventive Guidelines  Dietary Guidelines for Americans, 2010  [employer]'s MyPlate  ASA Prophylaxis  Lung CA Screening    Zhou Emmanuel MD  [facility]    Identified Health Risks:

## 2021-12-16 LAB
CREAT UR-MCNC: 126 MG/DL
MICROALBUMIN UR-MCNC: 74 MG/L
MICROALBUMIN/CREAT UR: 58.73 MG/G CR (ref 0–17)

## 2021-12-17 LAB
CHOLEST SERPL-MCNC: 142 MG/DL
FASTING STATUS PATIENT QL REPORTED: YES
HDLC SERPL-MCNC: 58 MG/DL
LDLC SERPL CALC-MCNC: 71 MG/DL
NONHDLC SERPL-MCNC: 84 MG/DL
TRIGL SERPL-MCNC: 64 MG/DL

## 2022-02-01 DIAGNOSIS — E11.65 TYPE 2 DIABETES MELLITUS WITH HYPERGLYCEMIA (H): ICD-10-CM

## 2022-02-01 DIAGNOSIS — E78.2 MIXED HYPERLIPIDEMIA: ICD-10-CM

## 2022-02-02 RX ORDER — LANCETS
EACH MISCELLANEOUS
Qty: 102 EACH | Refills: 2 | Status: SHIPPED | OUTPATIENT
Start: 2022-02-02 | End: 2022-11-09

## 2022-02-02 NOTE — TELEPHONE ENCOUNTER
Prescription approved per [facility] Refill Protocol.    Maddie BOURGEOIS RN  EP Triage

## 2022-04-01 DIAGNOSIS — E78.2 MIXED HYPERLIPIDEMIA: ICD-10-CM

## 2022-04-01 DIAGNOSIS — E11.9 WELL CONTROLLED TYPE 2 DIABETES MELLITUS (H): ICD-10-CM

## 2022-04-01 RX ORDER — LISINOPRIL 2.5 MG/1
TABLET ORAL
Qty: 90 TABLET | Refills: 1 | Status: SHIPPED | OUTPATIENT
Start: 2022-04-01 | End: 2022-07-11

## 2022-04-01 RX ORDER — SIMVASTATIN 20 MG
TABLET ORAL
Qty: 90 TABLET | Refills: 1 | Status: SHIPPED | OUTPATIENT
Start: 2022-04-01 | End: 2022-09-30

## 2022-04-01 NOTE — TELEPHONE ENCOUNTER
Prescription approved per [facility] Refill Protocol.    Maddie BOURGEOIS RN  EP Triage

## 2022-06-29 DIAGNOSIS — E11.9 WELL CONTROLLED TYPE 2 DIABETES MELLITUS (H): ICD-10-CM

## 2022-06-29 DIAGNOSIS — E78.2 MIXED HYPERLIPIDEMIA: ICD-10-CM

## 2022-07-01 ENCOUNTER — MYC MEDICAL ADVICE (OUTPATIENT)
Dept: FAMILY MEDICINE | Facility: CLINIC | Age: 77
End: 2022-07-01

## 2022-07-01 RX ORDER — METFORMIN HYDROCHLORIDE 750 MG/1
TABLET, EXTENDED RELEASE ORAL
Qty: 90 TABLET | Refills: 0 | Status: SHIPPED | OUTPATIENT
Start: 2022-07-01 | End: 2022-09-30

## 2022-07-01 NOTE — TELEPHONE ENCOUNTER
Last OV 12/15/21    Medication filled 1 time as pt is due for a follow-up in clinic. My chart has been sent to patient notifying to schedule appointment.        Salina ROBISON, Triage RN  [facility]

## 2022-07-01 NOTE — TELEPHONE ENCOUNTER
Spoke to patient , RX approved and upcoming appointment scheduled with Dr. Emmanuel.     Marie Conti RN  [facility]

## 2022-07-11 ENCOUNTER — OFFICE VISIT (OUTPATIENT)
Dept: FAMILY MEDICINE | Facility: CLINIC | Age: 77
End: 2022-07-11
Payer: MEDICARE

## 2022-07-11 VITALS
TEMPERATURE: 96.6 F | WEIGHT: 145 LBS | RESPIRATION RATE: 16 BRPM | DIASTOLIC BLOOD PRESSURE: 78 MMHG | BODY MASS INDEX: 21.98 KG/M2 | SYSTOLIC BLOOD PRESSURE: 146 MMHG | OXYGEN SATURATION: 99 % | HEART RATE: 68 BPM | HEIGHT: 68 IN

## 2022-07-11 DIAGNOSIS — E11.9 TYPE 2 DIABETES MELLITUS WITHOUT COMPLICATION, UNSPECIFIED WHETHER LONG TERM INSULIN USE (H): ICD-10-CM

## 2022-07-11 DIAGNOSIS — E78.2 MIXED HYPERLIPIDEMIA: Primary | ICD-10-CM

## 2022-07-11 DIAGNOSIS — Z23 HIGH PRIORITY FOR 2019-NCOV VACCINE: ICD-10-CM

## 2022-07-11 DIAGNOSIS — I10 BENIGN ESSENTIAL HYPERTENSION: ICD-10-CM

## 2022-07-11 LAB — HBA1C MFR BLD: 6.5 % (ref 0–5.6)

## 2022-07-11 PROCEDURE — 91306 COVID-19,PF,MODERNA (18+ YRS BOOSTER .25ML): CPT | Performed by: FAMILY MEDICINE

## 2022-07-11 PROCEDURE — 83036 HEMOGLOBIN GLYCOSYLATED A1C: CPT | Performed by: FAMILY MEDICINE

## 2022-07-11 PROCEDURE — 0064A COVID-19,PF,MODERNA (18+ YRS BOOSTER .25ML): CPT | Performed by: FAMILY MEDICINE

## 2022-07-11 PROCEDURE — 36415 COLL VENOUS BLD VENIPUNCTURE: CPT | Performed by: FAMILY MEDICINE

## 2022-07-11 PROCEDURE — 99214 OFFICE O/P EST MOD 30 MIN: CPT | Mod: 25 | Performed by: FAMILY MEDICINE

## 2022-07-11 PROCEDURE — 84460 ALANINE AMINO (ALT) (SGPT): CPT | Performed by: FAMILY MEDICINE

## 2022-07-11 PROCEDURE — 83721 ASSAY OF BLOOD LIPOPROTEIN: CPT | Performed by: FAMILY MEDICINE

## 2022-07-11 PROCEDURE — 80048 BASIC METABOLIC PNL TOTAL CA: CPT | Performed by: FAMILY MEDICINE

## 2022-07-11 RX ORDER — LISINOPRIL 5 MG/1
5 TABLET ORAL DAILY
Qty: 90 TABLET | Refills: 1 | COMMUNITY
Start: 2022-04-01 | End: 2022-08-18

## 2022-07-11 ASSESSMENT — PATIENT HEALTH QUESTIONNAIRE - PHQ9
SUM OF ALL RESPONSES TO PHQ QUESTIONS 1-9: 1
10. IF YOU CHECKED OFF ANY PROBLEMS, HOW DIFFICULT HAVE THESE PROBLEMS MADE IT FOR YOU TO DO YOUR WORK, TAKE CARE OF THINGS AT HOME, OR GET ALONG WITH OTHER PEOPLE: NOT DIFFICULT AT ALL
SUM OF ALL RESPONSES TO PHQ QUESTIONS 1-9: 1

## 2022-07-11 ASSESSMENT — PAIN SCALES - GENERAL: PAINLEVEL: NO PAIN (0)

## 2022-07-11 NOTE — PROGRESS NOTES
Assessment & Plan     Mixed hyperlipidemia  Stable, keep monitoring   - HEMOGLOBIN A1C; Future  - Basic metabolic panel  (Ca, Cl, CO2, Creat, Gluc, K, Na, BUN); Future  - ALT; Future  - LDL cholesterol direct; Future  - lisinopril (ZESTRIL) 5 MG tablet; Take 1 tablet (5 mg) by mouth daily    Type 2 diabetes mellitus without complication, unspecified whether long term insulin use (H)  Stable, will review the lab and update pot   - HEMOGLOBIN A1C; Future  - Basic metabolic panel  (Ca, Cl, CO2, Creat, Gluc, K, Na, BUN); Future  - ALT; Future  - LDL cholesterol direct; Future  - lisinopril (ZESTRIL) 5 MG tablet; Take 1 tablet (5 mg) by mouth daily    Benign essential hypertension  Has elevated BP, will have him to increase the dose of lisinopril to 5mg, encouraged him to recheck with us in 6 months   - lisinopril (ZESTRIL) 5 MG tablet; Take 1 tablet (5 mg) by mouth daily           FUTURE APPOINTMENTS:       - Follow-up visit in 6 months     Return in about 6 months (around 1/11/2023) for Physical Exam, Lab Work, Routine Visit, med check, DM recheck, BP Recheck.    Zhou Emmanuel MD  [facility] is a 76 year old presenting for the following health issues:  No chief complaint on file.      History of Present Illness       Diabetes:   He presents for follow up of diabetes.  He is checking home blood glucose one time daily. He checks blood glucose before meals.  Blood glucose is never over 200 and never under 70. When his blood glucose is low, the patient is asymptomatic for confusion, blurred vision, lethargy and reports not feeling dizzy, shaky, or weak.  He has no concerns regarding his diabetes at this time.  He is not experiencing numbness or burning in feet, excessive thirst, blurry vision, weight changes or redness, sores or blisters on feet.         He eats 2-3 servings of fruits and vegetables daily.He consumes 1 sweetened beverage(s) daily.He exercises with enough  "effort to increase his heart rate 30 to 60 minutes per day.  He exercises with enough effort to increase his heart rate 7 days per week.   He is taking medications regularly.    Today's PHQ-9         PHQ-9 Total Score: 1    PHQ-9 Q9 Thoughts of better off dead/self-harm past 2 weeks :   Not at all    How difficult have these problems made it for you to do your work, take care of things at home, or get along with other people: Not difficult at all         Review of Systems   Constitutional, HEENT, cardiovascular, pulmonary, gi and gu systems are negative, except as otherwise noted.      Objective    BP (!) 146/78 (BP Location: Left arm, Patient Position: Chair, Cuff Size: Adult Regular)   Pulse 68   Temp (!) 96.6  F (35.9  C) (Temporal)   Resp 16   Ht 1.727 m (5' 8\")   Wt 65.8 kg (145 lb)   SpO2 99%   BMI 22.05 kg/m    Body mass index is 22.05 kg/m .  Physical Exam   GENERAL: healthy, alert and no distress  NECK: no adenopathy, no asymmetry, masses, or scars and thyroid normal to palpation  RESP: lungs clear to auscultation - no rales, rhonchi or wheezes  CV: regular rate and rhythm, normal S1 S2, no S3 or S4, no murmur, click or rub, no peripheral edema and peripheral pulses strong  ABDOMEN: soft, nontender, no hepatosplenomegaly, no masses and bowel sounds normal  MS: no gross musculoskeletal defects noted, no edema                    .  ..  "

## 2022-07-13 LAB
ALT SERPL W P-5'-P-CCNC: 14 U/L (ref 0–70)
ANION GAP SERPL CALCULATED.3IONS-SCNC: 9 MMOL/L (ref 3–14)
BUN SERPL-MCNC: 22 MG/DL (ref 7–30)
CALCIUM SERPL-MCNC: 9.2 MG/DL (ref 8.5–10.1)
CHLORIDE BLD-SCNC: 106 MMOL/L (ref 94–109)
CO2 SERPL-SCNC: 25 MMOL/L (ref 20–32)
CREAT SERPL-MCNC: 1.09 MG/DL (ref 0.66–1.25)
GFR SERPL CREATININE-BSD FRML MDRD: 71 ML/MIN/1.73M2
GLUCOSE BLD-MCNC: 104 MG/DL (ref 70–99)
POTASSIUM BLD-SCNC: 4.6 MMOL/L (ref 3.4–5.3)
SODIUM SERPL-SCNC: 140 MMOL/L (ref 133–144)

## 2022-07-14 LAB — LDLC SERPL DIRECT ASSAY-MCNC: 76 MG/DL

## 2022-08-17 DIAGNOSIS — E78.2 MIXED HYPERLIPIDEMIA: ICD-10-CM

## 2022-08-17 DIAGNOSIS — E11.9 TYPE 2 DIABETES MELLITUS WITHOUT COMPLICATION, UNSPECIFIED WHETHER LONG TERM INSULIN USE (H): ICD-10-CM

## 2022-08-17 DIAGNOSIS — I10 BENIGN ESSENTIAL HYPERTENSION: ICD-10-CM

## 2022-08-17 NOTE — TELEPHONE ENCOUNTER
Reason for call:  Medication     [facility] of the [facility]    Name of the medication requested:   Lisinopril    Phone number to reach patient:  Telephone Information:    [phone]      Notes:    Klaudia Baugh,  [facility]

## 2022-08-18 RX ORDER — LISINOPRIL 5 MG/1
5 TABLET ORAL DAILY
Qty: 90 TABLET | Refills: 1 | Status: SHIPPED | OUTPATIENT
Start: 2022-08-18 | End: 2022-11-11

## 2022-08-18 NOTE — TELEPHONE ENCOUNTER
Routing refill request to provider for review/approval because:  Drug not on the [facility] refill protocol .     BP Readings from Last 3 Encounters:   07/11/22 (!) 146/78   12/15/21 128/78   10/21/20 136/80        Marie Conti RN  [facility] Triage

## 2022-09-30 DIAGNOSIS — E78.2 MIXED HYPERLIPIDEMIA: ICD-10-CM

## 2022-09-30 DIAGNOSIS — E11.9 WELL CONTROLLED TYPE 2 DIABETES MELLITUS (H): ICD-10-CM

## 2022-09-30 NOTE — TELEPHONE ENCOUNTER
Medication Question or Refill      What medication are you calling about (include dose and sig)?: Metformin, Simvastatin     Controlled Substance Agreement on file:   CSA -- Patient Level:    CSA: None found at the patient level.       Who prescribed the medication?: Dr. bejarano    Do you need a refill? Yes: Pt recently moved and would like medications sent to [facility] in [place]    Patient offered an appointment? No    Do you have any questions or concerns?  No    Preferred Pharmacy:   [id number] [address] [address]  Phone: [phone] Fax: [phone]    Klaudia Baugh,  [facility]

## 2022-10-01 ENCOUNTER — TRANSFERRED RECORDS (OUTPATIENT)
Dept: MULTI SPECIALTY CLINIC | Facility: CLINIC | Age: 77
End: 2022-10-01

## 2022-10-01 LAB — RETINOPATHY: NORMAL

## 2022-10-04 RX ORDER — METFORMIN HYDROCHLORIDE 750 MG/1
TABLET, EXTENDED RELEASE ORAL
Qty: 90 TABLET | Refills: 1 | Status: SHIPPED | OUTPATIENT
Start: 2022-10-04 | End: 2023-03-24

## 2022-10-04 RX ORDER — SIMVASTATIN 20 MG
20 TABLET ORAL AT BEDTIME
Qty: 90 TABLET | Refills: 1 | Status: SHIPPED | OUTPATIENT
Start: 2022-10-04 | End: 2023-03-24

## 2022-10-04 NOTE — TELEPHONE ENCOUNTER
Prescription approved per [facility] Refill Protocol.    Estefania Rendon RN  [facility] Triage Team

## 2022-10-15 ENCOUNTER — HEALTH MAINTENANCE LETTER (OUTPATIENT)
Age: 77
End: 2022-10-15

## 2022-10-24 ENCOUNTER — TRANSFERRED RECORDS (OUTPATIENT)
Dept: HEALTH INFORMATION MANAGEMENT | Facility: CLINIC | Age: 77
End: 2022-10-24

## 2022-10-24 LAB — RETINOPATHY: NEGATIVE

## 2022-11-09 DIAGNOSIS — E11.9 TYPE 2 DIABETES MELLITUS WITHOUT COMPLICATION, UNSPECIFIED WHETHER LONG TERM INSULIN USE (H): ICD-10-CM

## 2022-11-09 DIAGNOSIS — I10 BENIGN ESSENTIAL HYPERTENSION: ICD-10-CM

## 2022-11-09 DIAGNOSIS — E78.2 MIXED HYPERLIPIDEMIA: ICD-10-CM

## 2022-11-09 DIAGNOSIS — E11.65 TYPE 2 DIABETES MELLITUS WITH HYPERGLYCEMIA (H): ICD-10-CM

## 2022-11-09 NOTE — TELEPHONE ENCOUNTER
Medication Question or Refill    Contacts       Type Contact Phone/Fax    11/09/2022 12:26 PM CST Phone (Incoming) Yury Noriega (Self) [phone] (M)          What medication are you calling about (include dose and sig)?: Lisinopril, lancets, test strips,     Controlled Substance Agreement on file:   CSA -- Patient Level:    CSA: None found at the patient level.       Who prescribed the medication?: Emmanuel    Do you need a refill? Yes:     When did you use the medication last? today    Patient offered an appointment? Yes: Jan 9 for Annual wellness    Do you have any questions or concerns?  No    Preferred Pharmacy:   [facility] #[id number] [address] [address]  Phone: [phone] Fax: [phone]      Could we send this information to you in [employer] or would you prefer to receive a phone call?:   Patient would prefer a phone call   Okay to leave a detailed message?: Yes at Home number on file [phone] (home)

## 2022-11-11 RX ORDER — BLOOD SUGAR DIAGNOSTIC
STRIP MISCELLANEOUS
Qty: 100 STRIP | Refills: 0 | Status: SHIPPED | OUTPATIENT
Start: 2022-11-11 | End: 2023-11-22

## 2022-11-11 RX ORDER — LANCETS
EACH MISCELLANEOUS
Qty: 102 EACH | Refills: 2 | Status: SHIPPED | OUTPATIENT
Start: 2022-11-11 | End: 2023-11-06

## 2022-11-11 RX ORDER — LISINOPRIL 5 MG/1
5 TABLET ORAL DAILY
Qty: 90 TABLET | Refills: 1 | Status: SHIPPED | OUTPATIENT
Start: 2022-11-11 | End: 2023-01-09

## 2022-11-11 NOTE — TELEPHONE ENCOUNTER
Testing supplies - Prescription approved per [facility] Refill Protocol.    Routing refill request to provider for review/approval because:  BP Readings from Last 3 Encounters:   07/11/22 (!) 146/78   12/15/21 128/78   10/21/20 136/80     Tabitha Montgomery RN

## 2023-01-09 ENCOUNTER — OFFICE VISIT (OUTPATIENT)
Dept: FAMILY MEDICINE | Facility: CLINIC | Age: 78
End: 2023-01-09
Payer: MEDICARE

## 2023-01-09 VITALS
BODY MASS INDEX: 21.82 KG/M2 | SYSTOLIC BLOOD PRESSURE: 132 MMHG | OXYGEN SATURATION: 98 % | TEMPERATURE: 97.4 F | WEIGHT: 144 LBS | HEIGHT: 68 IN | RESPIRATION RATE: 18 BRPM | HEART RATE: 85 BPM | DIASTOLIC BLOOD PRESSURE: 80 MMHG

## 2023-01-09 DIAGNOSIS — E11.9 TYPE 2 DIABETES MELLITUS WITHOUT COMPLICATION, WITHOUT LONG-TERM CURRENT USE OF INSULIN (H): ICD-10-CM

## 2023-01-09 DIAGNOSIS — I10 BENIGN ESSENTIAL HYPERTENSION: ICD-10-CM

## 2023-01-09 DIAGNOSIS — E78.2 MIXED HYPERLIPIDEMIA: ICD-10-CM

## 2023-01-09 DIAGNOSIS — N18.31 CHRONIC KIDNEY DISEASE, STAGE 3A (H): ICD-10-CM

## 2023-01-09 DIAGNOSIS — Z12.5 SCREENING FOR PROSTATE CANCER: ICD-10-CM

## 2023-01-09 DIAGNOSIS — Z00.00 ENCOUNTER FOR MEDICARE ANNUAL WELLNESS EXAM: Primary | ICD-10-CM

## 2023-01-09 DIAGNOSIS — Z13.220 SCREENING FOR HYPERLIPIDEMIA: ICD-10-CM

## 2023-01-09 DIAGNOSIS — E11.649 UNCONTROLLED TYPE 2 DIABETES MELLITUS WITH HYPOGLYCEMIA WITHOUT COMA (H): ICD-10-CM

## 2023-01-09 DIAGNOSIS — E11.9 TYPE 2 DIABETES MELLITUS WITHOUT COMPLICATION, UNSPECIFIED WHETHER LONG TERM INSULIN USE (H): ICD-10-CM

## 2023-01-09 LAB
ALBUMIN SERPL BCG-MCNC: 4.6 G/DL (ref 3.5–5.2)
ALP SERPL-CCNC: 76 U/L (ref 40–129)
ALT SERPL W P-5'-P-CCNC: 12 U/L (ref 10–50)
ANION GAP SERPL CALCULATED.3IONS-SCNC: 12 MMOL/L (ref 7–15)
AST SERPL W P-5'-P-CCNC: 20 U/L (ref 10–50)
BILIRUB SERPL-MCNC: 0.5 MG/DL
BUN SERPL-MCNC: 22.4 MG/DL (ref 8–23)
CALCIUM SERPL-MCNC: 9.6 MG/DL (ref 8.8–10.2)
CHLORIDE SERPL-SCNC: 105 MMOL/L (ref 98–107)
CHOLEST SERPL-MCNC: 162 MG/DL
CREAT SERPL-MCNC: 1.04 MG/DL (ref 0.67–1.17)
CREAT UR-MCNC: 72.2 MG/DL
DEPRECATED HCO3 PLAS-SCNC: 22 MMOL/L (ref 22–29)
ERYTHROCYTE [DISTWIDTH] IN BLOOD BY AUTOMATED COUNT: 13.7 % (ref 10–15)
GFR SERPL CREATININE-BSD FRML MDRD: 75 ML/MIN/1.73M2
GLUCOSE SERPL-MCNC: 154 MG/DL (ref 70–99)
HBA1C MFR BLD: 6.7 % (ref 0–5.6)
HCT VFR BLD AUTO: 42.5 % (ref 40–53)
HDLC SERPL-MCNC: 54 MG/DL
HGB BLD-MCNC: 14.1 G/DL (ref 13.3–17.7)
LDLC SERPL CALC-MCNC: 90 MG/DL
MCH RBC QN AUTO: 29.4 PG (ref 26.5–33)
MCHC RBC AUTO-ENTMCNC: 33.2 G/DL (ref 31.5–36.5)
MCV RBC AUTO: 89 FL (ref 78–100)
MICROALBUMIN UR-MCNC: 23.6 MG/L
MICROALBUMIN/CREAT UR: 32.69 MG/G CR (ref 0–17)
NONHDLC SERPL-MCNC: 108 MG/DL
PLATELET # BLD AUTO: 234 10E3/UL (ref 150–450)
POTASSIUM SERPL-SCNC: 5.1 MMOL/L (ref 3.4–5.3)
PROT SERPL-MCNC: 7.5 G/DL (ref 6.4–8.3)
PSA SERPL-MCNC: 1.02 NG/ML (ref 0–6.5)
RBC # BLD AUTO: 4.8 10E6/UL (ref 4.4–5.9)
SODIUM SERPL-SCNC: 139 MMOL/L (ref 136–145)
TRIGL SERPL-MCNC: 91 MG/DL
WBC # BLD AUTO: 6.6 10E3/UL (ref 4–11)

## 2023-01-09 PROCEDURE — 83036 HEMOGLOBIN GLYCOSYLATED A1C: CPT | Performed by: FAMILY MEDICINE

## 2023-01-09 PROCEDURE — 80061 LIPID PANEL: CPT | Performed by: FAMILY MEDICINE

## 2023-01-09 PROCEDURE — 85027 COMPLETE CBC AUTOMATED: CPT | Performed by: FAMILY MEDICINE

## 2023-01-09 PROCEDURE — 36415 COLL VENOUS BLD VENIPUNCTURE: CPT | Performed by: FAMILY MEDICINE

## 2023-01-09 PROCEDURE — 99207 PR FOOT EXAM NO CHARGE: CPT | Mod: 25 | Performed by: FAMILY MEDICINE

## 2023-01-09 PROCEDURE — 82570 ASSAY OF URINE CREATININE: CPT | Performed by: FAMILY MEDICINE

## 2023-01-09 PROCEDURE — G0439 PPPS, SUBSEQ VISIT: HCPCS | Performed by: FAMILY MEDICINE

## 2023-01-09 PROCEDURE — 80053 COMPREHEN METABOLIC PANEL: CPT | Performed by: FAMILY MEDICINE

## 2023-01-09 PROCEDURE — 99214 OFFICE O/P EST MOD 30 MIN: CPT | Mod: 25 | Performed by: FAMILY MEDICINE

## 2023-01-09 PROCEDURE — G0103 PSA SCREENING: HCPCS | Performed by: FAMILY MEDICINE

## 2023-01-09 PROCEDURE — 92551 PURE TONE HEARING TEST AIR: CPT | Performed by: FAMILY MEDICINE

## 2023-01-09 PROCEDURE — 82043 UR ALBUMIN QUANTITATIVE: CPT | Performed by: FAMILY MEDICINE

## 2023-01-09 RX ORDER — LISINOPRIL 5 MG/1
5 TABLET ORAL DAILY
Qty: 90 TABLET | Refills: 1 | Status: SHIPPED | OUTPATIENT
Start: 2023-01-09 | End: 2023-07-13

## 2023-01-09 ASSESSMENT — ENCOUNTER SYMPTOMS
SHORTNESS OF BREATH: 0
COUGH: 0
PARESTHESIAS: 0
DIZZINESS: 0
PALPITATIONS: 0
EYE PAIN: 0
ABDOMINAL PAIN: 0
HEMATOCHEZIA: 0
DIARRHEA: 0
ARTHRALGIAS: 0
FEVER: 0
MYALGIAS: 0
JOINT SWELLING: 0
FREQUENCY: 0
WEAKNESS: 0
HEADACHES: 0
CONSTIPATION: 0
NERVOUS/ANXIOUS: 0
HEARTBURN: 0
HEMATURIA: 0
SORE THROAT: 0
CHILLS: 0
NAUSEA: 0
DYSURIA: 0

## 2023-01-09 ASSESSMENT — ACTIVITIES OF DAILY LIVING (ADL): CURRENT_FUNCTION: NO ASSISTANCE NEEDED

## 2023-01-09 NOTE — PATIENT INSTRUCTIONS
Patient Education   Personalized Prevention Plan  You are due for the preventive services outlined below.  Your care team is available to assist you in scheduling these services.  If you have already completed any of these items, please share that information with your care team to update in your medical record.  Health Maintenance Due   Topic Date Due     Zoster (Shingles) Vaccine (1 of 2) Never done     COVID-19 Vaccine (5 - Booster for Moderna series) 09/05/2022     Eye Exam  10/18/2022     Cholesterol Lab  12/15/2022     Kidney Microalbumin Urine Test  12/15/2022     Hemoglobin  12/15/2022     A1C Lab  01/11/2023       Understanding USDA MyPlate  The USDA has guidelines to help you make healthy food choices. These are called MyPlate. MyPlate shows the food groups that make up healthy meals using the image of a place setting. Before you eat, think about the healthiest choices for what to put on your plate or in your cup or bowl. To learn more about building a healthy plate, visit www.choosemyplate.gov.    The food groups    Fruits. Any fruit or 100% fruit juice counts as part of the Fruit Group. Fruits may be fresh, canned, frozen, or dried, and may be whole, cut-up, or pureed. Make 1/2 of your plate fruits and vegetables.    Vegetables. Any vegetable or 100% vegetable juice counts as a member of the Vegetable Group. Vegetables may be fresh, frozen, canned, or dried. They can be served raw or cooked and may be whole, cut-up, or mashed. Make 1/2 of your plate fruits and vegetables.    Grains. All foods made from grains are part of the Grains Group. These include wheat, rice, oats, cornmeal, and barley. Grains are often used to make foods such as bread, pasta, oatmeal, cereal, tortillas, and grits. Grains should be no more than 1/4 of your plate. At least half of your grains should be whole grains.    Protein. This group includes meat, poultry, seafood, beans and peas, eggs, processed soy products (such as tofu),  nuts (including nut butters), and seeds. Make protein choices no more than 1/4 of your plate. Meat and poultry choices should be lean or low fat.    Dairy. The Dairy Group includes all fluid milk products and foods made from milk that contain calcium, such as yogurt and cheese. (Foods that have little calcium, such as cream, butter, and cream cheese, are not part of this group.) Most dairy choices should be low-fat or fat-free.    Oils. Oils aren't a food group, but they do contain essential nutrients. However it's important to watch your intake of oils. These are fats that are liquid at room temperature. They include canola, corn, olive, soybean, vegetable, and sunflower oil. Foods that are mainly oil include mayonnaise, certain salad dressings, and soft margarines. You likely already get your daily oil allowance from the foods you eat.  Things to limit  Eating healthy also means limiting these things in your diet:       Salt (sodium). Many processed foods have a lot of sodium. To keep sodium intake down, eat fresh vegetables, meats, poultry, and seafood when possible. Purchase low-sodium, reduced-sodium, or no-salt-added food products at the store. And don't add salt to your meals at home. Instead, season them with herbs and spices such as dill, oregano, cumin, and paprika. Or try adding flavor with lemon or lime zest and juice.    Saturated fat. Saturated fats are most often found in animal products such as beef, pork, and chicken. They are often solid at room temperature, such as butter. To reduce your saturated fat intake, choose leaner cuts of meat and poultry. And try healthier cooking methods such as grilling, broiling, roasting, or baking. For a simple lower-fat swap, use plain nonfat yogurt instead of mayonnaise when making potato salad or macaroni salad.    Added sugars. These are sugars added to foods. They are in foods such as ice cream, candy, soda, fruit drinks, sports drinks, energy drinks, cookies,  pastries, jams, and syrups. Cut down on added sugars by sharing sweet treats with a family member or friend. You can also choose fruit for dessert, and drink water or other unsweetened beverages.     [employer] last reviewed this educational content on 6/1/2020 2000-2021 [employer]. All rights reserved. This information is not intended as a substitute for professional medical care. Always follow your healthcare professional's instructions.

## 2023-01-09 NOTE — PROGRESS NOTES
"SUBJECTIVE:   Yury is a 77 year old who presents for Preventive Visit.  Patient has been advised of split billing requirements and indicates understanding: Yes  Are you in the first 12 months of your Medicare coverage?  No    Healthy Habits:     In general, how would you rate your overall health?  Good    Frequency of exercise:  6-7 days/week    Duration of exercise:  15-30 minutes    Do you usually eat at least 4 servings of fruit and vegetables a day, include whole grains    & fiber and avoid regularly eating high fat or \"junk\" foods?  No    Taking medications regularly:  Yes    Medication side effects:  None    Ability to successfully perform activities of daily living:  No assistance needed    Home Safety:  No safety concerns identified    Hearing Impairment:  No hearing concerns    In the past 6 months, have you been bothered by leaking of urine?  No    In general, how would you rate your overall mental or emotional health?  Good      PHQ-2 Total Score: 0    Additional concerns today:  No        Have you ever done Advance Care Planning? (For example, a Health Directive, POLST, or a discussion with a medical provider or your loved ones about your wishes): Yes, advance care planning is on file.      Right Ear:      1000 Hz RESPONSE- on Level: 40 db (Conditioning sound)   1000 Hz: RESPONSE- on Level:   20 db    2000 Hz: RESPONSE- on Level:   20 db    4000 Hz: RESPONSE- on Level:   20 db     Left Ear:      4000 Hz: RESPONSE- on Level:   20 db    2000 Hz: RESPONSE- on Level:   20 db    1000 Hz: RESPONSE- on Level:   20 db     500 Hz: RESPONSE- on Level: 25 db    Right Ear:    500 Hz: RESPONSE- on Level: 25 db    Hearing Acuity: Pass    Hearing Assessment: normal   Fall risk  Fallen 2 or more times in the past year?: No  Any fall with injury in the past year?: No    Cognitive Screening   1) Repeat 3 items (Leader, Season, Table)    2) Clock draw: NORMAL  3) 3 item recall: Recalls 3 objects  Results: 3 items " recalled: COGNITIVE IMPAIRMENT LESS LIKELY    Mini-CogTM Copyright [employer]. Licensed by the author for use in [facility]; reprinted with permission (ese@[facility]). All rights reserved.      Do you have sleep apnea, excessive snoring or daytime drowsiness?: no    Reviewed and updated as needed this visit by clinical staff                  Reviewed and updated as needed this visit by Provider                 Social History     Tobacco Use     Smoking status: Never     Smokeless tobacco: Never   Substance Use Topics     Alcohol use: Yes     Comment: rarely     If you drink alcohol do you typically have >3 drinks per day or >7 drinks per week? No    Alcohol Use 1/9/2023   Prescreen: >3 drinks/day or >7 drinks/week? No   Prescreen: >3 drinks/day or >7 drinks/week? -           Diabetes Follow-up    How often are you checking your blood sugar? One time daily  What time of day are you checking your blood sugars (select all that apply)?  Before meals  Have you had any blood sugars above 200?  No  Have you had any blood sugars below 70?  No    What symptoms do you notice when your blood sugar is low?  None    What concerns do you have today about your diabetes? None     Do you have any of these symptoms? (Select all that apply)  No numbness or tingling in feet.  No redness, sores or blisters on feet.  No complaints of excessive thirst.  No reports of blurry vision.  No significant changes to weight.    Have you had a diabetic eye exam in the last 12 months? Yes- Date of last eye exam: 10/2022,  Location: [facility]                Hyperlipidemia Follow-Up      Are you regularly taking any medication or supplement to lower your cholesterol?   Yes- simvastatin    Are you having muscle aches or other side effects that you think could be caused by your cholesterol lowering medication?  No    Hypertension Follow-up      Do you check your blood pressure regularly outside of the clinic? Yes     Are you following  a low salt diet? Yes    Are your blood pressures ever more than 140 on the top number (systolic) OR more   than 90 on the bottom number (diastolic), for example 140/90? No    BP Readings from Last 2 Encounters:   07/11/22 (!) 146/78   12/15/21 128/78     Hemoglobin A1C (%)   Date Value   07/11/2022 6.5 (H)   12/15/2021 6.7 (H)   07/18/2020 6.1 (H)   08/16/2019 6.5 (H)     LDL Cholesterol Calculated (mg/dL)   Date Value   12/15/2021 71   10/21/2020 75   03/14/2017 87     LDL Cholesterol Direct (mg/dL)   Date Value   07/11/2022 76   07/15/2021 87   01/16/2019 75   04/12/2018 75         Current providers sharing in care for this patient include:  Patient Care Team:  Zhou Emmanuel MD as PCP - General (Family Practice)  Zhou Emmanuel MD as Assigned PCP    The following health maintenance items are reviewed in Epic and correct as of today:  Health Maintenance   Topic Date Due     ZOSTER IMMUNIZATION (1 of 2) Never done     AORTIC ANEURYSM SCREENING (SYSTEM ASSIGNED)  Never done     COVID-19 Vaccine (5 - Booster for Moderna series) 09/05/2022     EYE EXAM  10/18/2022     LIPID  12/15/2022     MICROALBUMIN  12/15/2022     DIABETIC FOOT EXAM  12/15/2022     MEDICARE ANNUAL WELLNESS VISIT  12/15/2022     A1C  01/11/2023     BMP  07/11/2023     ANNUAL REVIEW OF HM ORDERS  07/11/2023     FALL RISK ASSESSMENT  01/09/2024     COLORECTAL CANCER SCREENING  01/05/2026     ADVANCE CARE PLANNING  12/15/2026     DTAP/TDAP/TD IMMUNIZATION (3 - Td or Tdap) 03/14/2027     HEPATITIS C SCREENING  Completed     PHQ-2 (once per calendar year)  Completed     INFLUENZA VACCINE  Completed     Pneumococcal Vaccine: 65+ Years  Completed     IPV IMMUNIZATION  Aged Out     MENINGITIS IMMUNIZATION  Aged Out     BP Readings from Last 3 Encounters:   01/09/23 132/80   07/11/22 (!) 146/78   12/15/21 128/78    Wt Readings from Last 3 Encounters:   01/09/23 65.3 kg (144 lb)   07/11/22 65.8 kg (145 lb)   12/15/21 65.3 kg (144 lb)                  Patient  Active Problem List   Diagnosis     BPH (benign prostatic hyperplasia)     CARDIOVASCULAR SCREENING; LDL GOAL LESS THAN 160     Advance Care Planning     Uncontrolled type 2 diabetes mellitus with hypoglycemia without coma (H)     H/O: rheumatic fever     Femur fracture, right (H)     Chronic kidney disease, stage 3a (H)     Past Surgical History:   Procedure Laterality Date     AS CLOSED RX FEMUR SHAFT FX       COLONOSCOPY N/A 1/5/2016    Procedure: COLONOSCOPY;  Surgeon: Jose Luis Nunez MD;  Location:  GI       Social History     Tobacco Use     Smoking status: Never     Smokeless tobacco: Never   Substance Use Topics     Alcohol use: Yes     Comment: rarely     Family History   Problem Relation Age of Onset     Diabetes Mother          Current Outpatient Medications   Medication Sig Dispense Refill     aspirin (ASA) 81 MG EC tablet Take 1 tablet (81 mg) by mouth daily       blood glucose (ACCU-CHEK DIONICIO PLUS) test strip Use to test blood sugar one time daily or as directed. 100 strip 0     blood glucose monitoring (ACCU-CHEK DIONICIO PLUS) meter device kit Use to test blood sugars 1 times daily or as directed. 1 kit 0     blood glucose monitoring (ACCU-CHEK FASTCLIX) lancets Use to test blood sugar one time daily or as directed. 102 each 2     lisinopril (ZESTRIL) 5 MG tablet Take 1 tablet (5 mg) by mouth daily 90 tablet 1     metFORMIN (GLUCOPHAGE-XR) 750 MG 24 hr tablet TAKE ONE TABLET BY MOUTH EVERY DAY WITH DINNER 90 tablet 1     simvastatin (ZOCOR) 20 MG tablet Take 1 tablet (20 mg) by mouth At Bedtime TAKE ONE TABLET BY MOUTH AT BEDTIME 90 tablet 1     No Known Allergies  Recent Labs   Lab Test 07/11/22  1459 12/15/21  1024 07/15/21  1136 07/15/21  1136 07/15/21  1109 10/21/20  1114 07/19/20  0929 07/18/20  1553 09/27/18  1020 04/12/18  1109 09/13/17  1359 03/14/17  1024   A1C 6.5* 6.7*  --  6.3*  --   --   --   --    < > 6.4   < > 6.8*   LDL 76 71  --   --  87 75  --   --    < > 75  --  87   HDL  --  58   "--   --   --  61  --   --   --   --   --  55   TRIG  --  64  --   --   --  79  --   --   --   --   --  79   ALT 14 19  --  21  --  19  --   --    < > 14   < > 22   CR 1.09 1.03   < > 1.20  --  1.12 0.95  --    < > 1.03   < > 0.90   GFRESTIMATED 71 71   < > 60*  --  65 79  --    < > 71   < > 84   GFRESTBLACK  --   --   --   --   --  75 >90  --    < > 86   < > >90   GFR Calc     POTASSIUM 4.6 4.6   < > 4.5  --  4.7 3.9  --    < > 4.1   < > 3.7   TSH  --   --   --   --   --   --   --  0.49  --  0.39*  --   --     < > = values in this interval not displayed.      Pneumonia Vaccine:For adults with an immunocompromising condition, cerebrospinal fluid leak, or cochlear implant, administer 1 dose of PCV13 first then give 1 dose of PPSV23 at least 1 year later. Anyone who received any doses of PPSV23 before age 65 should receive 1 final dose of the vaccine at age 65 or older. Administer this last dose at least 5 years after the prior PPSV23 dose.        Review of Systems   Constitutional: Negative for chills and fever.   HENT: Negative for congestion, ear pain, hearing loss and sore throat.    Eyes: Negative for pain and visual disturbance.   Respiratory: Negative for cough and shortness of breath.    Cardiovascular: Negative for chest pain, palpitations and peripheral edema.   Gastrointestinal: Negative for abdominal pain, constipation, diarrhea, heartburn, hematochezia and nausea.   Genitourinary: Negative for dysuria, frequency, genital sores, hematuria, impotence, penile discharge and urgency.   Musculoskeletal: Negative for arthralgias, joint swelling and myalgias.   Skin: Negative for rash.   Neurological: Negative for dizziness, weakness, headaches and paresthesias.   Psychiatric/Behavioral: Negative for mood changes. The patient is not nervous/anxious.          OBJECTIVE:   /80   Pulse 85   Temp 97.4  F (36.3  C)   Resp 18   Ht 1.727 m (5' 8\")   Wt 65.3 kg (144 lb)   SpO2 98%   BMI 21.90 " "kg/m   Estimated body mass index is 21.9 kg/m  as calculated from the following:    Height as of this encounter: 1.727 m (5' 8\").    Weight as of this encounter: 65.3 kg (144 lb).  Physical Exam  GENERAL: healthy, alert and no distress  EYES: Eyes grossly normal to inspection, PERRL and conjunctivae and sclerae normal  HENT: ear canals and TM's normal, nose and mouth without ulcers or lesions  NECK: no adenopathy, no asymmetry, masses, or scars and thyroid normal to palpation  RESP: lungs clear to auscultation - no rales, rhonchi or wheezes  CV: regular rate and rhythm, normal S1 S2, no S3 or S4, no murmur, click or rub, no peripheral edema and peripheral pulses strong  ABDOMEN: soft, nontender, no hepatosplenomegaly, no masses and bowel sounds normal   (male): normal male genitalia without lesions or urethral discharge, no hernia  MS: no gross musculoskeletal defects noted, no edema  NEURO: Normal strength and tone, mentation intact and speech normal  BACK: no CVA tenderness, no paralumbar tenderness  PSYCH: mentation appears normal, affect normal/bright  LYMPH: no cervical, supraclavicular, axillary, or inguinal adenopathy  Diabetic foot exam: normal DP and PT pulses, no trophic changes or ulcerative lesions and normal sensory exam        ASSESSMENT / PLAN:       ICD-10-CM    1. Encounter for Medicare annual wellness exam  Z00.00 Lipid panel reflex to direct LDL Non-fasting     Albumin Random Urine Quantitative with Creat Ratio     HEMOGLOBIN A1C     CBC with platelets     Comprehensive metabolic panel (BMP + Alb, Alk Phos, ALT, AST, Total. Bili, TP)     PSA, screen     Lipid panel reflex to direct LDL Non-fasting     Albumin Random Urine Quantitative with Creat Ratio     HEMOGLOBIN A1C     CBC with platelets     Comprehensive metabolic panel (BMP + Alb, Alk Phos, ALT, AST, Total. Bili, TP)     PSA, screen      2. Type 2 diabetes mellitus without complication, without long-term current use of insulin (H)  E11.9 " Lipid panel reflex to direct LDL Non-fasting     Albumin Random Urine Quantitative with Creat Ratio     HEMOGLOBIN A1C     FOOT EXAM     Lipid panel reflex to direct LDL Non-fasting     Albumin Random Urine Quantitative with Creat Ratio     HEMOGLOBIN A1C      3. Screening for hyperlipidemia  Z13.220 Lipid panel reflex to direct LDL Non-fasting     Lipid panel reflex to direct LDL Non-fasting      4. Screening for prostate cancer  Z12.5 PSA, screen     PSA, screen      5. Mixed hyperlipidemia  E78.2 Lipid panel reflex to direct LDL Non-fasting     Albumin Random Urine Quantitative with Creat Ratio     HEMOGLOBIN A1C     lisinopril (ZESTRIL) 5 MG tablet     Lipid panel reflex to direct LDL Non-fasting     Albumin Random Urine Quantitative with Creat Ratio     HEMOGLOBIN A1C      6. Type 2 diabetes mellitus without complication, unspecified whether long term insulin use (H)  E11.9 Lipid panel reflex to direct LDL Non-fasting     Albumin Random Urine Quantitative with Creat Ratio     HEMOGLOBIN A1C     lisinopril (ZESTRIL) 5 MG tablet     Lipid panel reflex to direct LDL Non-fasting     Albumin Random Urine Quantitative with Creat Ratio     HEMOGLOBIN A1C      7. Benign essential hypertension  I10 Lipid panel reflex to direct LDL Non-fasting     Albumin Random Urine Quantitative with Creat Ratio     HEMOGLOBIN A1C     lisinopril (ZESTRIL) 5 MG tablet     Lipid panel reflex to direct LDL Non-fasting     Albumin Random Urine Quantitative with Creat Ratio     HEMOGLOBIN A1C      8. Uncontrolled type 2 diabetes mellitus with hypoglycemia without coma (H)  E11.649       9. Chronic kidney disease, stage 3a (H)  N18.31           Patient has been advised of split billing requirements and indicates understanding: Yes      COUNSELING:  Reviewed preventive health counseling, as reflected in patient instructions        He reports that he has never smoked. He has never used smokeless tobacco.      Appropriate preventive services  were discussed with this patient, including applicable screening as appropriate for cardiovascular disease, diabetes, osteopenia/osteoporosis, and glaucoma.  As appropriate for age/gender, discussed screening for colorectal cancer, prostate cancer, breast cancer, and cervical cancer. Checklist reviewing preventive services available has been given to the patient.    Reviewed patients plan of care and provided an AVS. The Basic Care Plan (routine screening as documented in Health Maintenance) for Yury meets the Care Plan requirement. This Care Plan has been established and reviewed with the Patient.          Zhou Emmanuel MD  [facility]    Identified Health Risks:    The patient was counseled and encouraged to consider modifying their diet and eating habits. He was provided with information on recommended healthy diet options.

## 2023-07-11 NOTE — TELEPHONE ENCOUNTER
"Requested Prescriptions   Pending Prescriptions Disp Refills     metFORMIN (GLUCOPHAGE-XR) 750 MG 24 hr tablet [Pharmacy Med Name: METFORMIN HCL ER 750MG TB24] 90 tablet 1     Sig: TAKE ONE TABLET BY MOUTH EVERY DAY WITH DINNER   Last Written Prescription Date:  8/16/19  Last Fill Quantity: 90,  # refills: 1   Last office visit: 8/16/2019 with prescribing provider:  Major   Future Office Visit:        Biguanide Agents Failed - 2/27/2020 11:06 AM        Failed - Blood pressure less than 140/90 in past 6 months     BP Readings from Last 3 Encounters:   08/16/19 136/78   01/16/19 135/70   09/27/18 160/76                 Failed - Patient has documented LDL within the past 12 mos.     Recent Labs   Lab Test 01/16/19  1054   LDL 75             Failed - Patient has documented A1c within the specified period of time.     If HgbA1C is 8 or greater, it needs to be on file within the past 3 months.  If less than 8, must be on file within the past 6 months.     Recent Labs   Lab Test 08/16/19  1349   A1C 6.5*             Failed - Patient's CR is NOT>1.4 OR Patient's EGFR is NOT<45 within past 12 mos.     Recent Labs   Lab Test 01/16/19  1054   GFRESTIMATED 80   GFRESTBLACK >90       Recent Labs   Lab Test 01/16/19  1054   CR 0.95             Failed - Recent (6 mo) or future (30 days) visit within the authorizing provider's specialty     Patient had office visit in the last 6 months or has a visit in the next 30 days with authorizing provider or within the authorizing provider's specialty.  See \"Patient Info\" tab in inbasket, or \"Choose Columns\" in Meds & Orders section of the refill encounter.            Passed - Patient has had a Microalbumin in the past 15 mos.     Recent Labs   Lab Test 08/16/19  1348   MICROL 13   UMALCR 40.24*             Passed - Patient is age 10 or older        Passed - Patient does NOT have a diagnosis of CHF.        Passed - Medication is active on med list          " Bexarotene Counseling:  I discussed with the patient the risks of bexarotene including but not limited to hair loss, dry lips/skin/eyes, liver abnormalities, hyperlipidemia, pancreatitis, depression/suicidal ideation, photosensitivity, drug rash/allergic reactions, hypothyroidism, anemia, leukopenia, infection, cataracts, and teratogenicity.  Patient understands that they will need regular blood tests to check lipid profile, liver function tests, white blood cell count, thyroid function tests and pregnancy test if applicable.

## 2023-07-13 DIAGNOSIS — E78.2 MIXED HYPERLIPIDEMIA: ICD-10-CM

## 2023-07-13 DIAGNOSIS — E11.9 TYPE 2 DIABETES MELLITUS WITHOUT COMPLICATION, UNSPECIFIED WHETHER LONG TERM INSULIN USE (H): ICD-10-CM

## 2023-07-13 DIAGNOSIS — I10 BENIGN ESSENTIAL HYPERTENSION: ICD-10-CM

## 2023-07-13 RX ORDER — LISINOPRIL 5 MG/1
TABLET ORAL
Qty: 90 TABLET | Refills: 0 | Status: SHIPPED | OUTPATIENT
Start: 2023-07-13 | End: 2023-09-05

## 2023-08-20 ENCOUNTER — HEALTH MAINTENANCE LETTER (OUTPATIENT)
Age: 78
End: 2023-08-20

## 2023-09-05 ENCOUNTER — OFFICE VISIT (OUTPATIENT)
Dept: FAMILY MEDICINE | Facility: CLINIC | Age: 78
End: 2023-09-05
Payer: MEDICARE

## 2023-09-05 VITALS
DIASTOLIC BLOOD PRESSURE: 72 MMHG | SYSTOLIC BLOOD PRESSURE: 124 MMHG | RESPIRATION RATE: 10 BRPM | BODY MASS INDEX: 22.58 KG/M2 | OXYGEN SATURATION: 98 % | TEMPERATURE: 97 F | WEIGHT: 149 LBS | HEART RATE: 72 BPM | HEIGHT: 68 IN

## 2023-09-05 DIAGNOSIS — E11.649 UNCONTROLLED TYPE 2 DIABETES MELLITUS WITH HYPOGLYCEMIA WITHOUT COMA (H): Primary | ICD-10-CM

## 2023-09-05 DIAGNOSIS — E11.9 TYPE 2 DIABETES MELLITUS WITHOUT COMPLICATION, UNSPECIFIED WHETHER LONG TERM INSULIN USE (H): ICD-10-CM

## 2023-09-05 DIAGNOSIS — I10 BENIGN ESSENTIAL HYPERTENSION: ICD-10-CM

## 2023-09-05 DIAGNOSIS — N18.31 CHRONIC KIDNEY DISEASE, STAGE 3A (H): ICD-10-CM

## 2023-09-05 DIAGNOSIS — E78.2 MIXED HYPERLIPIDEMIA: ICD-10-CM

## 2023-09-05 LAB
ALT SERPL W P-5'-P-CCNC: 9 U/L (ref 0–70)
ANION GAP SERPL CALCULATED.3IONS-SCNC: 14 MMOL/L (ref 7–15)
BUN SERPL-MCNC: 17.7 MG/DL (ref 8–23)
CALCIUM SERPL-MCNC: 9.1 MG/DL (ref 8.8–10.2)
CHLORIDE SERPL-SCNC: 103 MMOL/L (ref 98–107)
CREAT SERPL-MCNC: 1.12 MG/DL (ref 0.67–1.17)
DEPRECATED HCO3 PLAS-SCNC: 24 MMOL/L (ref 22–29)
GFR SERPL CREATININE-BSD FRML MDRD: 69 ML/MIN/1.73M2
GLUCOSE SERPL-MCNC: 128 MG/DL (ref 70–99)
HBA1C MFR BLD: 7 % (ref 0–5.6)
LDLC SERPL DIRECT ASSAY-MCNC: 85 MG/DL
POTASSIUM SERPL-SCNC: 4.5 MMOL/L (ref 3.4–5.3)
SODIUM SERPL-SCNC: 141 MMOL/L (ref 136–145)

## 2023-09-05 PROCEDURE — 36415 COLL VENOUS BLD VENIPUNCTURE: CPT | Performed by: FAMILY MEDICINE

## 2023-09-05 PROCEDURE — 84460 ALANINE AMINO (ALT) (SGPT): CPT | Performed by: FAMILY MEDICINE

## 2023-09-05 PROCEDURE — 99214 OFFICE O/P EST MOD 30 MIN: CPT | Performed by: FAMILY MEDICINE

## 2023-09-05 PROCEDURE — 83036 HEMOGLOBIN GLYCOSYLATED A1C: CPT | Performed by: FAMILY MEDICINE

## 2023-09-05 PROCEDURE — 80048 BASIC METABOLIC PNL TOTAL CA: CPT | Performed by: FAMILY MEDICINE

## 2023-09-05 PROCEDURE — 83721 ASSAY OF BLOOD LIPOPROTEIN: CPT | Performed by: FAMILY MEDICINE

## 2023-09-05 RX ORDER — METFORMIN HYDROCHLORIDE 750 MG/1
TABLET, EXTENDED RELEASE ORAL
Qty: 90 TABLET | Refills: 1 | Status: SHIPPED | OUTPATIENT
Start: 2023-09-05 | End: 2024-03-06

## 2023-09-05 RX ORDER — LATANOPROST 50 UG/ML
SOLUTION/ DROPS OPHTHALMIC
COMMUNITY

## 2023-09-05 RX ORDER — LISINOPRIL 5 MG/1
5 TABLET ORAL DAILY
Qty: 90 TABLET | Refills: 1 | Status: SHIPPED | OUTPATIENT
Start: 2023-09-05 | End: 2024-04-19

## 2023-09-05 ASSESSMENT — PAIN SCALES - GENERAL: PAINLEVEL: NO PAIN (0)

## 2023-09-05 NOTE — PROGRESS NOTES
Assessment & Plan     Uncontrolled type 2 diabetes mellitus with hypoglycemia without coma (H)  Stable  Keep monitoring   - HEMOGLOBIN A1C; Future  - Hemoglobin A1c; Future  - Basic metabolic panel  (Ca, Cl, CO2, Creat, Gluc, K, Na, BUN); Future  - ALT; Future  - LDL cholesterol direct; Future    Mixed hyperlipidemia  Stable  Keep monitoring   - lisinopril (ZESTRIL) 5 MG tablet; Take 1 tablet (5 mg) by mouth daily  - metFORMIN (GLUCOPHAGE-XR) 750 MG 24 hr tablet; TAKE 1 TABLET BY MOUTH ONE TIME DAILY WITH DINNER  - Hemoglobin A1c; Future  - Basic metabolic panel  (Ca, Cl, CO2, Creat, Gluc, K, Na, BUN); Future  - ALT; Future  - LDL cholesterol direct; Future    Type 2 diabetes mellitus without complication, unspecified whether long term insulin use (H)  Mentioned above   - lisinopril (ZESTRIL) 5 MG tablet; Take 1 tablet (5 mg) by mouth daily  - Hemoglobin A1c; Future  - Basic metabolic panel  (Ca, Cl, CO2, Creat, Gluc, K, Na, BUN); Future  - ALT; Future  - LDL cholesterol direct; Future    Benign essential hypertension  Stable  Keep monitoring   - lisinopril (ZESTRIL) 5 MG tablet; Take 1 tablet (5 mg) by mouth daily  - Hemoglobin A1c; Future  - Basic metabolic panel  (Ca, Cl, CO2, Creat, Gluc, K, Na, BUN); Future  - ALT; Future  - LDL cholesterol direct; Future    Chronic kidney disease, stage 3a (H)  Stable   - Hemoglobin A1c; Future  - Basic metabolic panel  (Ca, Cl, CO2, Creat, Gluc, K, Na, BUN); Future  - ALT; Future  - LDL cholesterol direct; Future             FUTURE APPOINTMENTS:       - Follow-up visit in 6 months     Zhou Emmanuel MD  [facility] Cotton is a 77 year old, presenting for the following health issues:  Diabetes        9/5/2023    11:15 AM   Additional Questions   Roomed by Karly LARRY       History of Present Illness       Diabetes:   He presents for follow up of diabetes.  He is checking home blood glucose one time daily.   He checks blood glucose before  meals.  Blood glucose is never over 200 and never under 70. He is aware of hypoglycemia symptoms including none.    He has no concerns regarding his diabetes at this time.   He is not experiencing numbness or burning in feet, excessive thirst, blurry vision, weight changes or redness, sores or blisters on feet.           He eats 2-3 servings of fruits and vegetables daily.He consumes 0 sweetened beverage(s) daily.He exercises with enough effort to increase his heart rate 20 to 29 minutes per day.  He exercises with enough effort to increase his heart rate 6 days per week.   He is taking medications regularly.       Diabetes Follow-up    How often are you checking your blood sugar? One time daily  What time of day are you checking your blood sugars (select all that apply)?  Before meals  Have you had any blood sugars above 200?  No  Have you had any blood sugars below 70?  No  What symptoms do you notice when your blood sugar is low?  None  What concerns do you have today about your diabetes? None   Do you have any of these symptoms? (Select all that apply)  No numbness or tingling in feet.  No redness, sores or blisters on feet.  No complaints of excessive thirst.  No reports of blurry vision.  No significant changes to weight.          Hyperlipidemia Follow-Up    Are you regularly taking any medication or supplement to lower your cholesterol?   Yes- statin   Are you having muscle aches or other side effects that you think could be caused by your cholesterol lowering medication?  No    Hypertension Follow-up    Do you check your blood pressure regularly outside of the clinic? Yes   Are you following a low salt diet? Yes  Are your blood pressures ever more than 140 on the top number (systolic) OR more   than 90 on the bottom number (diastolic), for example 140/90? Yes    BP Readings from Last 2 Encounters:   09/05/23 124/72   01/09/23 132/80     Hemoglobin A1C (%)   Date Value   01/09/2023 6.7 (H)   07/11/2022 6.5  "(H)   07/18/2020 6.1 (H)   08/16/2019 6.5 (H)     LDL Cholesterol Calculated (mg/dL)   Date Value   01/09/2023 90   12/15/2021 71   10/21/2020 75   03/14/2017 87     LDL Cholesterol Direct (mg/dL)   Date Value   07/11/2022 76   07/15/2021 87   01/16/2019 75   04/12/2018 75           Review of Systems   Constitutional, HEENT, cardiovascular, pulmonary, gi and gu systems are negative, except as otherwise noted.      Objective    /72 (BP Location: Right arm, Patient Position: Sitting, Cuff Size: Adult Regular)   Pulse 72   Temp 97  F (36.1  C) (Temporal)   Resp 10   Ht 1.73 m (5' 8.11\")   Wt 67.6 kg (149 lb)   SpO2 98%   BMI 22.58 kg/m    Body mass index is 22.58 kg/m .  Physical Exam   GENERAL: healthy, alert and no distress  EYES: Eyes grossly normal to inspection, PERRL and conjunctivae and sclerae normal  HENT: ear canals and TM's normal, nose and mouth without ulcers or lesions  NECK: no adenopathy, no asymmetry, masses, or scars and thyroid normal to palpation  RESP: lungs clear to auscultation - no rales, rhonchi or wheezes  CV: regular rate and rhythm, normal S1 S2, no S3 or S4, no murmur, click or rub, no peripheral edema and peripheral pulses strong  ABDOMEN: soft, nontender, no hepatosplenomegaly, no masses and bowel sounds normal  MS: no gross musculoskeletal defects noted, no edema  SKIN: no suspicious lesions or rashes  NEURO: Normal strength and tone, mentation intact and speech normal  PSYCH: mentation appears normal, affect normal/bright  Diabetic foot exam: normal DP and PT pulses, no trophic changes or ulcerative lesions, and normal sensory exam                      "

## 2023-10-30 ENCOUNTER — TRANSFERRED RECORDS (OUTPATIENT)
Dept: HEALTH INFORMATION MANAGEMENT | Facility: CLINIC | Age: 78
End: 2023-10-30
Payer: MEDICARE

## 2023-10-30 LAB — RETINOPATHY: NEGATIVE

## 2023-11-06 DIAGNOSIS — E11.65 TYPE 2 DIABETES MELLITUS WITH HYPERGLYCEMIA (H): ICD-10-CM

## 2023-11-06 DIAGNOSIS — E78.2 MIXED HYPERLIPIDEMIA: ICD-10-CM

## 2023-11-06 RX ORDER — LANCETS
EACH MISCELLANEOUS
Qty: 102 EACH | Refills: 0 | Status: SHIPPED | OUTPATIENT
Start: 2023-11-06 | End: 2024-04-24

## 2023-11-22 DIAGNOSIS — E11.9 TYPE 2 DIABETES MELLITUS WITHOUT COMPLICATION, UNSPECIFIED WHETHER LONG TERM INSULIN USE (H): ICD-10-CM

## 2023-11-22 RX ORDER — BLOOD SUGAR DIAGNOSTIC
STRIP MISCELLANEOUS
Qty: 100 STRIP | Refills: 0 | Status: SHIPPED | OUTPATIENT
Start: 2023-11-22 | End: 2024-04-24

## 2023-11-22 NOTE — TELEPHONE ENCOUNTER
Prescription approved per [facility] Refill Protocol.  Sima Gerber, RN  [facility] Triage Nurse

## 2023-12-13 DIAGNOSIS — E78.2 MIXED HYPERLIPIDEMIA: ICD-10-CM

## 2023-12-13 RX ORDER — SIMVASTATIN 20 MG
TABLET ORAL
Qty: 90 TABLET | Refills: 1 | Status: SHIPPED | OUTPATIENT
Start: 2023-12-13 | End: 2024-06-03

## 2024-01-04 ENCOUNTER — PATIENT OUTREACH (OUTPATIENT)
Dept: CARE COORDINATION | Facility: CLINIC | Age: 79
End: 2024-01-04
Payer: MEDICARE

## 2024-01-18 ENCOUNTER — PATIENT OUTREACH (OUTPATIENT)
Dept: CARE COORDINATION | Facility: CLINIC | Age: 79
End: 2024-01-18
Payer: MEDICARE

## 2024-03-06 ENCOUNTER — TELEPHONE (OUTPATIENT)
Dept: FAMILY MEDICINE | Facility: CLINIC | Age: 79
End: 2024-03-06
Payer: MEDICARE

## 2024-03-06 DIAGNOSIS — E78.2 MIXED HYPERLIPIDEMIA: ICD-10-CM

## 2024-03-06 RX ORDER — METFORMIN HYDROCHLORIDE 750 MG/1
TABLET, EXTENDED RELEASE ORAL
Qty: 90 TABLET | Refills: 0 | Status: SHIPPED | OUTPATIENT
Start: 2024-03-06 | End: 2024-06-03

## 2024-03-06 NOTE — TELEPHONE ENCOUNTER
Spoke to pt and helped schedule appointment for 4/25/24 with PCP.     Marguerite Garrison RN on 3/6/2024 at 9:26 AM

## 2024-03-17 ENCOUNTER — HEALTH MAINTENANCE LETTER (OUTPATIENT)
Age: 79
End: 2024-03-17

## 2024-04-19 DIAGNOSIS — E78.2 MIXED HYPERLIPIDEMIA: ICD-10-CM

## 2024-04-19 DIAGNOSIS — I10 BENIGN ESSENTIAL HYPERTENSION: ICD-10-CM

## 2024-04-19 DIAGNOSIS — E11.9 TYPE 2 DIABETES MELLITUS WITHOUT COMPLICATION, UNSPECIFIED WHETHER LONG TERM INSULIN USE (H): ICD-10-CM

## 2024-04-19 RX ORDER — LISINOPRIL 5 MG/1
5 TABLET ORAL DAILY
Qty: 90 TABLET | Refills: 0 | Status: SHIPPED | OUTPATIENT
Start: 2024-04-19 | End: 2024-07-17

## 2024-04-24 DIAGNOSIS — E11.9 TYPE 2 DIABETES MELLITUS WITHOUT COMPLICATION, UNSPECIFIED WHETHER LONG TERM INSULIN USE (H): ICD-10-CM

## 2024-04-24 DIAGNOSIS — E11.65 TYPE 2 DIABETES MELLITUS WITH HYPERGLYCEMIA (H): ICD-10-CM

## 2024-04-24 DIAGNOSIS — E78.2 MIXED HYPERLIPIDEMIA: ICD-10-CM

## 2024-04-24 RX ORDER — BLOOD SUGAR DIAGNOSTIC
STRIP MISCELLANEOUS
Qty: 100 STRIP | Refills: 0 | Status: SHIPPED | OUTPATIENT
Start: 2024-04-24 | End: 2024-09-09

## 2024-04-24 RX ORDER — LANCETS
EACH MISCELLANEOUS
Qty: 102 EACH | Refills: 0 | Status: SHIPPED | OUTPATIENT
Start: 2024-04-24 | End: 2024-09-09

## 2024-04-24 NOTE — TELEPHONE ENCOUNTER
Prescription approved per [facility] Refill Protocol.  Sima Gerber, RN  [facility] Triage Nurse

## 2024-04-25 ENCOUNTER — OFFICE VISIT (OUTPATIENT)
Dept: FAMILY MEDICINE | Facility: CLINIC | Age: 79
End: 2024-04-25
Payer: MEDICARE

## 2024-04-25 VITALS
WEIGHT: 152.7 LBS | RESPIRATION RATE: 12 BRPM | TEMPERATURE: 97.6 F | HEIGHT: 67 IN | HEART RATE: 67 BPM | SYSTOLIC BLOOD PRESSURE: 128 MMHG | BODY MASS INDEX: 23.97 KG/M2 | OXYGEN SATURATION: 99 % | DIASTOLIC BLOOD PRESSURE: 70 MMHG

## 2024-04-25 DIAGNOSIS — Z00.01 ENCOUNTER FOR GENERAL ADULT MEDICAL EXAMINATION WITH ABNORMAL FINDINGS: Primary | ICD-10-CM

## 2024-04-25 DIAGNOSIS — E11.649 UNCONTROLLED TYPE 2 DIABETES MELLITUS WITH HYPOGLYCEMIA WITHOUT COMA (H): ICD-10-CM

## 2024-04-25 DIAGNOSIS — Z12.5 SCREENING FOR PROSTATE CANCER: ICD-10-CM

## 2024-04-25 DIAGNOSIS — N18.30 TYPE 2 DIABETES MELLITUS WITH STAGE 3 CHRONIC KIDNEY DISEASE, UNSPECIFIED WHETHER LONG TERM INSULIN USE, UNSPECIFIED WHETHER STAGE 3A OR 3B CKD (H): ICD-10-CM

## 2024-04-25 DIAGNOSIS — E11.22 TYPE 2 DIABETES MELLITUS WITH STAGE 3 CHRONIC KIDNEY DISEASE, UNSPECIFIED WHETHER LONG TERM INSULIN USE, UNSPECIFIED WHETHER STAGE 3A OR 3B CKD (H): ICD-10-CM

## 2024-04-25 DIAGNOSIS — N18.31 CHRONIC KIDNEY DISEASE, STAGE 3A (H): ICD-10-CM

## 2024-04-25 LAB
ALBUMIN SERPL BCG-MCNC: 4.6 G/DL (ref 3.5–5.2)
ALP SERPL-CCNC: 72 U/L (ref 40–150)
ALT SERPL W P-5'-P-CCNC: 11 U/L (ref 0–70)
ANION GAP SERPL CALCULATED.3IONS-SCNC: 11 MMOL/L (ref 7–15)
AST SERPL W P-5'-P-CCNC: 18 U/L (ref 0–45)
BILIRUB SERPL-MCNC: 0.3 MG/DL
BUN SERPL-MCNC: 23.4 MG/DL (ref 8–23)
CALCIUM SERPL-MCNC: 9.4 MG/DL (ref 8.8–10.2)
CHLORIDE SERPL-SCNC: 102 MMOL/L (ref 98–107)
CHOLEST SERPL-MCNC: 165 MG/DL
CREAT SERPL-MCNC: 1.2 MG/DL (ref 0.67–1.17)
CREAT UR-MCNC: 103 MG/DL
DEPRECATED HCO3 PLAS-SCNC: 23 MMOL/L (ref 22–29)
EGFRCR SERPLBLD CKD-EPI 2021: 63 ML/MIN/1.73M2
ERYTHROCYTE [DISTWIDTH] IN BLOOD BY AUTOMATED COUNT: 12.6 % (ref 10–15)
FASTING STATUS PATIENT QL REPORTED: YES
GLUCOSE SERPL-MCNC: 162 MG/DL (ref 70–99)
HBA1C MFR BLD: 7 % (ref 0–5.6)
HCT VFR BLD AUTO: 42 % (ref 40–53)
HDLC SERPL-MCNC: 49 MG/DL
HGB BLD-MCNC: 14.2 G/DL (ref 13.3–17.7)
LDLC SERPL CALC-MCNC: 97 MG/DL
MCH RBC QN AUTO: 29 PG (ref 26.5–33)
MCHC RBC AUTO-ENTMCNC: 33.8 G/DL (ref 31.5–36.5)
MCV RBC AUTO: 86 FL (ref 78–100)
MICROALBUMIN UR-MCNC: 41.7 MG/L
MICROALBUMIN/CREAT UR: 40.49 MG/G CR (ref 0–17)
NONHDLC SERPL-MCNC: 116 MG/DL
PLATELET # BLD AUTO: 193 10E3/UL (ref 150–450)
POTASSIUM SERPL-SCNC: 4.6 MMOL/L (ref 3.4–5.3)
PROT SERPL-MCNC: 7.4 G/DL (ref 6.4–8.3)
PSA SERPL DL<=0.01 NG/ML-MCNC: 1.31 NG/ML (ref 0–6.5)
RBC # BLD AUTO: 4.89 10E6/UL (ref 4.4–5.9)
SODIUM SERPL-SCNC: 136 MMOL/L (ref 135–145)
TRIGL SERPL-MCNC: 97 MG/DL
WBC # BLD AUTO: 6.5 10E3/UL (ref 4–11)

## 2024-04-25 PROCEDURE — 85027 COMPLETE CBC AUTOMATED: CPT | Performed by: FAMILY MEDICINE

## 2024-04-25 PROCEDURE — 83036 HEMOGLOBIN GLYCOSYLATED A1C: CPT | Performed by: FAMILY MEDICINE

## 2024-04-25 PROCEDURE — 82570 ASSAY OF URINE CREATININE: CPT | Performed by: FAMILY MEDICINE

## 2024-04-25 PROCEDURE — 80061 LIPID PANEL: CPT | Performed by: FAMILY MEDICINE

## 2024-04-25 PROCEDURE — 80053 COMPREHEN METABOLIC PANEL: CPT | Performed by: FAMILY MEDICINE

## 2024-04-25 PROCEDURE — 36415 COLL VENOUS BLD VENIPUNCTURE: CPT | Performed by: FAMILY MEDICINE

## 2024-04-25 PROCEDURE — G0103 PSA SCREENING: HCPCS | Performed by: FAMILY MEDICINE

## 2024-04-25 PROCEDURE — G0439 PPPS, SUBSEQ VISIT: HCPCS | Performed by: FAMILY MEDICINE

## 2024-04-25 PROCEDURE — 82043 UR ALBUMIN QUANTITATIVE: CPT | Performed by: FAMILY MEDICINE

## 2024-04-25 RX ORDER — RESPIRATORY SYNCYTIAL VIRUS VACCINE 120MCG/0.5
0.5 KIT INTRAMUSCULAR ONCE
Qty: 1 EACH | Refills: 0 | Status: CANCELLED | OUTPATIENT
Start: 2024-04-25 | End: 2024-04-25

## 2024-04-25 SDOH — HEALTH STABILITY: PHYSICAL HEALTH: ON AVERAGE, HOW MANY MINUTES DO YOU ENGAGE IN EXERCISE AT THIS LEVEL?: 20 MIN

## 2024-04-25 SDOH — HEALTH STABILITY: PHYSICAL HEALTH: ON AVERAGE, HOW MANY DAYS PER WEEK DO YOU ENGAGE IN MODERATE TO STRENUOUS EXERCISE (LIKE A BRISK WALK)?: 7 DAYS

## 2024-04-25 ASSESSMENT — PAIN SCALES - GENERAL: PAINLEVEL: NO PAIN (0)

## 2024-04-25 ASSESSMENT — SOCIAL DETERMINANTS OF HEALTH (SDOH): HOW OFTEN DO YOU GET TOGETHER WITH FRIENDS OR RELATIVES?: MORE THAN THREE TIMES A WEEK

## 2024-04-25 NOTE — PATIENT INSTRUCTIONS
Preventive Care Advice   This is general advice given by our system to help you stay healthy. However, your care team may have specific advice just for you. Please talk to your care team about your preventive care needs.  Nutrition  Eat 5 or more servings of fruits and vegetables each day.  Try wheat bread, brown rice and whole grain pasta (instead of white bread, rice, and pasta).  Get enough calcium and vitamin D. Check the label on foods and aim for 100% of the RDA (recommended daily allowance).  Lifestyle  Exercise at least 150 minutes each week   (30 minutes a day, 5 days a week).  Do muscle strengthening activities 2 days a week. These help control your weight and prevent disease.  No smoking.  Wear sunscreen to prevent skin cancer.  Have a dental exam and cleaning every 6 months.  Yearly exams  See your health care team every year to talk about:  Any changes in your health.  Any medicines your care team has prescribed.  Preventive care, family planning, and ways to prevent chronic diseases.  Shots (vaccines)   HPV shots (up to age 26), if you've never had them before.  Hepatitis B shots (up to age 59), if you've never had them before.  COVID-19 shot: Get this shot when it's due.  Flu shot: Get a flu shot every year.  Tetanus shot: Get a tetanus shot every 10 years.  Pneumococcal, hepatitis A, and RSV shots: Ask your care team if you need these based on your risk.  Shingles shot (for age 50 and up).  General health tests  Diabetes screening:  Starting at age 35, Get screened for diabetes at least every 3 years.  If you are younger than age 35, ask your care team if you should be screened for diabetes.  Cholesterol test: At age 39, start having a cholesterol test every 5 years, or more often if advised.  Bone density scan (DEXA): At age 50, ask your care team if you should have this scan for osteoporosis (brittle bones).  Hepatitis C: Get tested at least once in your life.  STIs (sexually transmitted  infections)  Before age 24: Ask your care team if you should be screened for STIs.  After age 24: Get screened for STIs if you're at risk. You are at risk for STIs (including HIV) if:  You are sexually active with more than one person.  You don't use condoms every time.  You or a partner was diagnosed with a sexually transmitted infection.  If you are at risk for HIV, ask about PrEP medicine to prevent HIV.  Get tested for HIV at least once in your life, whether you are at risk for HIV or not.  Cancer screening tests  Cervical cancer screening: If you have a cervix, begin getting regular cervical cancer screening tests at age 21. Most people who have regular screenings with normal results can stop after age 65. Talk about this with your provider.  Breast cancer scan (mammogram): If you've ever had breasts, begin having regular mammograms starting at age 40. This is a scan to check for breast cancer.  Colon cancer screening: It is important to start screening for colon cancer at age 45.  Have a colonoscopy test every 10 years (or more often if you're at risk) Or, ask your provider about stool tests like a FIT test every year or Cologuard test every 3 years.  To learn more about your testing options, visit: https://www.[employer]/692066.pdf.  For help making a decision, visit: https://bit.ly/gt08504.  Prostate cancer screening test: If you have a prostate and are age 55 to 69, ask your provider if you would benefit from a yearly prostate cancer screening test.  Lung cancer screening: If you are a current or former smoker age 50 to 80, ask your care team if ongoing lung cancer screenings are right for you.  For informational purposes only. Not to replace the advice of your health care provider. Copyright   2023 [employer]. All rights reserved. Clinically reviewed by the [facility]. [employer] 052276 - REV 01/24.    Learning About Stress  What is stress?     Stress is your  body's response to a hard situation. Your body can have a physical, emotional, or mental response. Stress is a fact of life for most people, and it affects everyone differently. What causes stress for you may not be stressful for someone else.  A lot of things can cause stress. You may feel stress when you go on a job interview, take a test, or run a race. This kind of short-term stress is normal and even useful. It can help you if you need to work hard or react quickly. For example, stress can help you finish an important job on time.  Long-term stress is caused by ongoing stressful situations or events. Examples of long-term stress include long-term health problems, ongoing problems at work, or conflicts in your family. Long-term stress can harm your health.  How does stress affect your health?  When you are stressed, your body responds as though you are in danger. It makes hormones that speed up your heart, make you breathe faster, and give you a burst of energy. This is called the fight-or-flight stress response. If the stress is over quickly, your body goes back to normal and no harm is done.  But if stress happens too often or lasts too long, it can have bad effects. Long-term stress can make you more likely to get sick, and it can make symptoms of some diseases worse. If you tense up when you are stressed, you may develop neck, shoulder, or low back pain. Stress is linked to high blood pressure and heart disease.  Stress also harms your emotional health. It can make you varela, tense, or depressed. Your relationships may suffer, and you may not do well at work or school.  What can you do to manage stress?  You can try these things to help manage stress:   Do something active. Exercise or activity can help reduce stress. Walking is a great way to get started. Even everyday activities such as housecleaning or yard work can help.  Try yoga or roman chi. These techniques combine exercise and meditation. You may need  some training at first to learn them.  Do something you enjoy. For example, listen to music or go to a movie. Practice your hobby or do volunteer work.  Meditate. This can help you relax, because you are not worrying about what happened before or what may happen in the future.  Do guided imagery. Imagine yourself in any setting that helps you feel calm. You can use online videos, books, or a teacher to guide you.  Do breathing exercises. For example:  From a standing position, bend forward from the waist with your knees slightly bent. Let your arms dangle close to the floor.  Breathe in slowly and deeply as you return to a standing position. Roll up slowly and lift your head last.  Hold your breath for just a few seconds in the standing position.  Breathe out slowly and bend forward from the waist.  Let your feelings out. Talk, laugh, cry, and express anger when you need to. Talking with supportive friends or family, a counselor, or a mihir leader about your feelings is a healthy way to relieve stress. Avoid discussing your feelings with people who make you feel worse.  Write. It may help to write about things that are bothering you. This helps you find out how much stress you feel and what is causing it. When you know this, you can find better ways to cope.  What can you do to prevent stress?  You might try some of these things to help prevent stress:  Manage your time. This helps you find time to do the things you want and need to do.  Get enough sleep. Your body recovers from the stresses of the day while you are sleeping.  Get support. Your family, friends, and community can make a difference in how you experience stress.  Limit your news feed. Avoid or limit time on social media or news that may make you feel stressed.  Do something active. Exercise or activity can help reduce stress. Walking is a great way to get started.  Where can you learn more?  Go to https://www.healthwise.net/patiented  Enter N032 in the  "search box to learn more about \"Learning About Stress.\"  Current as of: October 24, 2023               Content Version: 14.0    2968-5564 [employer].   Care instructions adapted under license by your healthcare professional. If you have questions about a medical condition or this instruction, always ask your healthcare professional. [employer] disclaims any warranty or liability for your use of this information.      Bladder Training: Care Instructions  Your Care Instructions     Bladder training is used to treat urge incontinence and stress incontinence. Urge incontinence means that the need to urinate comes on so fast that you can't get to a toilet in time. Stress incontinence means that you leak urine because of pressure on your bladder. For example, it may happen when you laugh, cough, or lift something heavy.  Bladder training can increase how long you can wait before you have to urinate. It can also help your bladder hold more urine. And it can give you better control over the urge to urinate.  It is important to remember that bladder training takes a few weeks to a few months to make a difference. You may not see results right away, but don't give up.  Follow-up care is a key part of your treatment and safety. Be sure to make and go to all appointments, and call your doctor if you are having problems. It's also a good idea to know your test results and keep a list of the medicines you take.  How can you care for yourself at home?  Work with your doctor to come up with a bladder training program that is right for you. You may use one or more of the following methods.  Delayed urination  In the beginning, try to keep from urinating for 5 minutes after you first feel the need to go.  While you wait, take deep, slow breaths to relax. Kegel exercises can also help you delay the need to go to the bathroom.  After some practice, when you can easily wait 5 minutes to urinate, try to wait " "10 minutes before you urinate.  Slowly increase the waiting period until you are able to control when you have to urinate.  Scheduled urination  Empty your bladder when you first wake up in the morning.  Schedule times throughout the day when you will urinate.  Start by going to the bathroom every hour, even if you don't need to go.  Slowly increase the time between trips to the bathroom.  When you have found a schedule that works well for you, keep doing it.  If you wake up during the night and have to urinate, do it. Apply your schedule to waking hours only.  Kegel exercises  These tighten and strengthen pelvic muscles, which can help you control the flow of urine. (If doing these exercises causes pain, stop doing them and talk with your doctor.) To do Kegel exercises:  Squeeze your muscles as if you were trying not to pass gas. Or squeeze your muscles as if you were stopping the flow of urine. Your belly, legs, and buttocks shouldn't move.  Hold the squeeze for 3 seconds, then relax for 5 to 10 seconds.  Start with 3 seconds, then add 1 second each week until you are able to squeeze for 10 seconds.  Repeat the exercise 10 times a session. Do 3 to 8 sessions a day.  When should you call for help?  Watch closely for changes in your health, and be sure to contact your doctor if:    Your incontinence is getting worse.     You do not get better as expected.   Where can you learn more?  Go to https://www.[employer].net/patiented  Enter V684 in the search box to learn more about \"Bladder Training: Care Instructions.\"  Current as of: November 15, 2023               Content Version: 14.0    1365-5538 [employer].   Care instructions adapted under license by your healthcare professional. If you have questions about a medical condition or this instruction, always ask your healthcare professional. [employer] disclaims any warranty or liability for your use of this information.      "

## 2024-04-25 NOTE — PROGRESS NOTES
Preventive Care Visit  [facility] TEZ Emmanuel MD, Family Medicine  Apr 25, 2024      Assessment & Plan     Uncontrolled type 2 diabetes mellitus with hypoglycemia without coma (H)    - Lipid panel reflex to direct LDL Non-fasting; Future  - HEMOGLOBIN A1C; Future    Chronic kidney disease, stage 3a (H)    - Albumin Random Urine Quantitative with Creat Ratio; Future    Encounter for general adult medical examination with abnormal findings    - Lipid panel reflex to direct LDL Non-fasting; Future  - Albumin Random Urine Quantitative with Creat Ratio; Future  - HEMOGLOBIN A1C; Future  - CBC with platelets; Future  - Comprehensive metabolic panel (BMP + Alb, Alk Phos, ALT, AST, Total. Bili, TP); Future  - PSA, screen; Future    Screening for prostate cancer    - PSA, screen; Future    Type 2 diabetes mellitus with stage 3 chronic kidney disease, unspecified whether long term insulin use, unspecified whether stage 3a or 3b CKD (H)      Patient has been advised of split billing requirements and indicates understanding: Yes          Counseling  Appropriate preventive services were discussed with this patient, including applicable screening as appropriate for fall prevention, nutrition, physical activity, Tobacco-use cessation, weight loss and cognition.  Checklist reviewing preventive services available has been given to the patient.  Reviewed patient's diet, addressing concerns and/or questions.   The patient was instructed to see the dentist every 6 months.   He is at risk for psychosocial distress and has been provided with information to reduce risk.   Information on urinary incontinence and treatment options given to patient.       FUTURE APPOINTMENTS:       - Follow-up visit in 6 months for DM    Danni Cotton is a 78 year old, presenting for the following:  Physical (Fasting.)        4/25/2024     9:33 AM   Additional Questions   Roomed by [facility]  Directive  Patient has a Health Care Directive on file  Advance care planning document is on file and is current.    HPI      4/25/2024   General Health   How would you rate your overall physical health? Excellent   Feel stress (tense, anxious, or unable to sleep) Only a little   (!) STRESS CONCERN      4/25/2024   Nutrition   Diet: Regular (no restrictions)         4/25/2024   Exercise   Days per week of moderate/strenous exercise 7 days   Average minutes spent exercising at this level 20 min         4/25/2024   Social Factors   Frequency of gathering with friends or relatives More than three times a week   Worry food won't last until get money to buy more No   Food not last or not have enough money for food? No   Do you have housing?  Yes   Are you worried about losing your housing? No   Lack of transportation? No   Unable to get utilities (heat,electricity)? No         4/25/2024   Fall Risk   Fallen 2 or more times in the past year? No   Trouble with walking or balance? No          4/25/2024   Activities of Daily Living- Home Safety   Needs help with the following daily activites None of the above   Safety concerns in the home None of the above         4/25/2024   Dental   Dentist two times every year? (!) NO         4/25/2024   Hearing Screening   Hearing concerns? None of the above         4/25/2024   Driving Risk Screening   Patient/family members have concerns about driving No         4/25/2024   General Alertness/Fatigue Screening   Have you been more tired than usual lately? No         4/25/2024   Urinary Incontinence Screening   Bothered by leaking urine in past 6 months Yes         4/25/2024   TB Screening   Were you born outside of the [place]? No         Today's PHQ-2 Score:       4/25/2024     9:36 AM   PHQ-2 ( 1999 [employer])   Q1: Little interest or pleasure in doing things 0   Q2: Feeling down, depressed or hopeless 0   PHQ-2 Score 0   Q1: Little interest or pleasure in doing things Not at all   Q2: Feeling  down, depressed or hopeless Not at all   PHQ-2 Score 0           4/25/2024   Substance Use   Alcohol more than 3/day or more than 7/wk No   Do you have a current opioid prescription? No   How severe/bad is pain from 1 to 10? 0/10 (No Pain)   Do you use any other substances recreationally? No     Social History     Tobacco Use    Smoking status: Never    Smokeless tobacco: Never   Vaping Use    Vaping status: Never Used   Substance Use Topics    Alcohol use: Yes     Comment: rarely    Drug use: No       ASCVD Risk   The 10-year ASCVD risk score (Jono JOHNSON, et al., 2019) is: 61.8%    Values used to calculate the score:      Age: 78 years      Sex: Male      Is Non- : No      Diabetic: Yes      Tobacco smoker: No      Systolic Blood Pressure: 162 mmHg      Is BP treated: Yes      HDL Cholesterol: 54 mg/dL      Total Cholesterol: 162 mg/dL            Reviewed and updated as needed this visit by Provider                    Past Medical History:   Diagnosis Date    BPH (benign prostatic hyperplasia) 10/13/2015    CARDIOVASCULAR SCREENING; LDL GOAL LESS THAN 160 10/13/2015    High cholesterol     Hypertension     Type 2 diabetes mellitus, uncontrolled 10/19/2015     Past Surgical History:   Procedure Laterality Date    AS CLOSED RX FEMUR SHAFT FX      COLONOSCOPY N/A 1/5/2016    Procedure: COLONOSCOPY;  Surgeon: Jose Luis Nunez MD;  Location:  GI     BP Readings from Last 3 Encounters:   04/25/24 128/70   09/05/23 124/72   01/09/23 132/80    Wt Readings from Last 3 Encounters:   04/25/24 69.3 kg (152 lb 11.2 oz)   09/05/23 67.6 kg (149 lb)   01/09/23 65.3 kg (144 lb)                  Patient Active Problem List   Diagnosis    BPH (benign prostatic hyperplasia)    CARDIOVASCULAR SCREENING; LDL GOAL LESS THAN 160    Advance Care Planning    Type 2 diabetes mellitus with stage 3 chronic kidney disease, unspecified whether long term insulin use, unspecified whether stage 3a or 3b CKD (H)     H/O: rheumatic fever    Femur fracture, right (H)    Chronic kidney disease, stage 3a (H)     Past Surgical History:   Procedure Laterality Date    AS CLOSED RX FEMUR SHAFT FX      COLONOSCOPY N/A 1/5/2016    Procedure: COLONOSCOPY;  Surgeon: Jose Luis Nunez MD;  Location:  GI       Social History     Tobacco Use    Smoking status: Never    Smokeless tobacco: Never   Substance Use Topics    Alcohol use: Yes     Comment: rarely     Family History   Problem Relation Age of Onset    Diabetes Mother          Current Outpatient Medications   Medication Sig Dispense Refill    aspirin (ASA) 81 MG EC tablet Take 1 tablet (81 mg) by mouth daily      blood glucose (ACCU-CHEK DIONICIO PLUS) test strip Use to test blood sugar one time daily or as directed. 100 strip 0    blood glucose monitoring (ACCU-CHEK DIONICIO PLUS) meter device kit Use to test blood sugars 1 times daily or as directed. 1 kit 0    blood glucose monitoring (ACCU-CHEK FASTCLIX) lancets Use to test blood sugar one time daily or as directed. 102 each 0    latanoprost (XALATAN) 0.005 % ophthalmic solution INSTILL 1 DROP INTO BOTH EYES AT BEDTIME*      lisinopril (ZESTRIL) 5 MG tablet Take 1 tablet (5 mg) by mouth daily 90 tablet 0    metFORMIN (GLUCOPHAGE-XR) 750 MG 24 hr tablet TAKE 1 TABLET BY MOUTH ONE TIME DAILY WITH DINNER 90 tablet 0    simvastatin (ZOCOR) 20 MG tablet Take 1 tablet (20 mg) by mouth At Bedtime 90 tablet 1     No Known Allergies  Recent Labs   Lab Test 09/05/23  1146 01/09/23  0953 07/11/22  1459 12/15/21  1024 07/15/21  1109 10/21/20  1114 07/19/20  0929 07/18/20  1553 09/27/18  1020 04/12/18  1109   A1C 7.0* 6.7* 6.5* 6.7*   < >  --   --   --    < > 6.4   LDL 85 90 76 71   < > 75  --   --    < > 75   HDL  --  54  --  58  --  61  --   --   --   --    TRIG  --  91  --  64  --  79  --   --   --   --    ALT 9 12 14 19   < > 19  --   --    < > 14   CR 1.12 1.04 1.09 1.03   < > 1.12 0.95  --    < > 1.03   GFRESTIMATED 69 75 71 71   < > 65 79  --    " < > 71   GFRESTBLACK  --   --   --   --   --  75 >90  --    < > 86   POTASSIUM 4.5 5.1 4.6 4.6   < > 4.7 3.9  --    < > 4.1   TSH  --   --   --   --   --   --   --  0.49  --  0.39*    < > = values in this interval not displayed.      Current providers sharing in care for this patient include:  Patient Care Team:  Zhou Emmanule MD as PCP - General (Family Practice)  Zhou Emmanuel MD as Assigned PCP    The following health maintenance items are reviewed in Epic and correct as of today:  Health Maintenance   Topic Date Due    RSV VACCINE (Pregnancy & 60+) (1 - 1-dose 60+ series) Never done    COVID-19 Vaccine (5 - 2023-24 season) 09/01/2023    MEDICARE ANNUAL WELLNESS VISIT  01/09/2024    LIPID  01/09/2024    MICROALBUMIN  01/09/2024    A1C  03/05/2024    HEMOGLOBIN  01/09/2024    BMP  09/05/2024    DIABETIC FOOT EXAM  09/05/2024    ANNUAL REVIEW OF HM ORDERS  09/05/2024    EYE EXAM  10/30/2024    FALL RISK ASSESSMENT  04/25/2025    DTAP/TDAP/TD IMMUNIZATION (3 - Td or Tdap) 03/14/2027    ADVANCE CARE PLANNING  01/09/2028    HEPATITIS C SCREENING  Completed    PHQ-2 (once per calendar year)  Completed    INFLUENZA VACCINE  Completed    Pneumococcal Vaccine: 65+ Years  Completed    URINALYSIS  Completed    ZOSTER IMMUNIZATION  Completed    IPV IMMUNIZATION  Aged Out    HPV IMMUNIZATION  Aged Out    MENINGITIS IMMUNIZATION  Aged Out    RSV MONOCLONAL ANTIBODY  Aged Out    COLORECTAL CANCER SCREENING  Discontinued         Review of Systems  Constitutional, HEENT, cardiovascular, pulmonary, GI, , musculoskeletal, neuro, skin, endocrine and psych systems are negative, except as otherwise noted.     Objective    Exam  /70   Pulse 67   Temp 97.6  F (36.4  C) (Temporal)   Resp 12   Ht 1.713 m (5' 7.44\")   Wt 69.3 kg (152 lb 11.2 oz)   SpO2 99%   BMI 23.60 kg/m     Estimated body mass index is 23.6 kg/m  as calculated from the following:    Height as of this encounter: 1.713 m (5' 7.44\").    Weight as of this " encounter: 69.3 kg (152 lb 11.2 oz).    Physical Exam  GENERAL: alert and no distress  EYES: Eyes grossly normal to inspection, PERRL and conjunctivae and sclerae normal  HENT: ear canals and TM's normal, nose and mouth without ulcers or lesions  NECK: no adenopathy, no asymmetry, masses, or scars  RESP: lungs clear to auscultation - no rales, rhonchi or wheezes  CV: regular rate and rhythm, normal S1 S2, no S3 or S4, no murmur, click or rub, no peripheral edema  ABDOMEN: soft, nontender, no hepatosplenomegaly, no masses and bowel sounds normal  MS: no gross musculoskeletal defects noted, no edema  SKIN: no suspicious lesions or rashes  NEURO: Normal strength and tone, mentation intact and speech normal  BACK: no CVA tenderness, no paralumbar tenderness  PSYCH: mentation appears normal, affect normal/bright  LYMPH: no cervical, supraclavicular, axillary, or inguinal adenopathy        4/25/2024   Mini Cog   Clock Draw Score 2 Normal   3 Item Recall 2 objects recalled   Mini Cog Total Score 4              Signed Electronically by: Zhou Emmanuel MD

## 2024-06-03 DIAGNOSIS — E78.2 MIXED HYPERLIPIDEMIA: ICD-10-CM

## 2024-06-03 RX ORDER — SIMVASTATIN 20 MG
20 TABLET ORAL AT BEDTIME
Qty: 90 TABLET | Refills: 1 | Status: SHIPPED | OUTPATIENT
Start: 2024-06-03

## 2024-06-03 RX ORDER — METFORMIN HYDROCHLORIDE 750 MG/1
TABLET, EXTENDED RELEASE ORAL
Qty: 90 TABLET | Refills: 1 | Status: SHIPPED | OUTPATIENT
Start: 2024-06-03 | End: 2024-10-01

## 2024-07-17 DIAGNOSIS — E78.2 MIXED HYPERLIPIDEMIA: ICD-10-CM

## 2024-07-17 DIAGNOSIS — I10 BENIGN ESSENTIAL HYPERTENSION: ICD-10-CM

## 2024-07-17 DIAGNOSIS — E11.9 TYPE 2 DIABETES MELLITUS WITHOUT COMPLICATION, UNSPECIFIED WHETHER LONG TERM INSULIN USE (H): ICD-10-CM

## 2024-07-17 RX ORDER — LISINOPRIL 5 MG/1
5 TABLET ORAL DAILY
Qty: 90 TABLET | Refills: 2 | Status: SHIPPED | OUTPATIENT
Start: 2024-07-17 | End: 2024-09-30

## 2024-09-09 DIAGNOSIS — E11.9 TYPE 2 DIABETES MELLITUS WITHOUT COMPLICATION, UNSPECIFIED WHETHER LONG TERM INSULIN USE (H): ICD-10-CM

## 2024-09-09 DIAGNOSIS — E78.2 MIXED HYPERLIPIDEMIA: ICD-10-CM

## 2024-09-09 DIAGNOSIS — E11.65 TYPE 2 DIABETES MELLITUS WITH HYPERGLYCEMIA (H): ICD-10-CM

## 2024-09-09 RX ORDER — BLOOD SUGAR DIAGNOSTIC
STRIP MISCELLANEOUS
Qty: 100 STRIP | Refills: 0 | Status: SHIPPED | OUTPATIENT
Start: 2024-09-09

## 2024-09-09 RX ORDER — LANCETS
EACH MISCELLANEOUS
Qty: 100 EACH | Refills: 0 | Status: SHIPPED | OUTPATIENT
Start: 2024-09-09

## 2024-09-25 ENCOUNTER — TELEPHONE (OUTPATIENT)
Dept: FAMILY MEDICINE | Facility: CLINIC | Age: 79
End: 2024-09-25
Payer: MEDICARE

## 2024-09-25 NOTE — TELEPHONE ENCOUNTER
Called pt to relay instructions from Dr. Emmanuel. Pt states his understanding. Appt scheduled for Monday, 9/30/2024 at 10:40am (check-in) to see Dr. Emmanuel.    Klaudia Baugh,  [facility]

## 2024-09-25 NOTE — TELEPHONE ENCOUNTER
S-(situation): Medication reaction.     B-(background): Patient had lip swelling during the night.     He went to [facility] urgent care this morning.     Urgent care told the patient to discontinue the lisinopril and follow up  with PCP.     His lip is back to normal.  He was not prescribed any medications in urgent care.     Patient has not tried any new medications or foods.     A-(assessment): Please advise.     R-(recommendations): Will send to PCP.       Marie Conti RN  -[facility]

## 2024-09-25 NOTE — TELEPHONE ENCOUNTER
Please have him to continue holding off of lisinopril and schedule with me coming Monday sameday slot    thx

## 2024-09-30 ENCOUNTER — OFFICE VISIT (OUTPATIENT)
Dept: FAMILY MEDICINE | Facility: CLINIC | Age: 79
End: 2024-09-30
Payer: MEDICARE

## 2024-09-30 VITALS
OXYGEN SATURATION: 99 % | HEART RATE: 79 BPM | BODY MASS INDEX: 23.03 KG/M2 | WEIGHT: 149 LBS | SYSTOLIC BLOOD PRESSURE: 189 MMHG | RESPIRATION RATE: 10 BRPM | DIASTOLIC BLOOD PRESSURE: 84 MMHG | TEMPERATURE: 97.4 F

## 2024-09-30 DIAGNOSIS — E11.22 TYPE 2 DIABETES MELLITUS WITH STAGE 3 CHRONIC KIDNEY DISEASE, UNSPECIFIED WHETHER LONG TERM INSULIN USE, UNSPECIFIED WHETHER STAGE 3A OR 3B CKD (H): Primary | ICD-10-CM

## 2024-09-30 DIAGNOSIS — I10 BENIGN ESSENTIAL HYPERTENSION: ICD-10-CM

## 2024-09-30 DIAGNOSIS — N18.30 TYPE 2 DIABETES MELLITUS WITH STAGE 3 CHRONIC KIDNEY DISEASE, UNSPECIFIED WHETHER LONG TERM INSULIN USE, UNSPECIFIED WHETHER STAGE 3A OR 3B CKD (H): Primary | ICD-10-CM

## 2024-09-30 DIAGNOSIS — N18.31 CHRONIC KIDNEY DISEASE, STAGE 3A (H): ICD-10-CM

## 2024-09-30 LAB
ALT SERPL W P-5'-P-CCNC: 9 U/L (ref 0–70)
ANION GAP SERPL CALCULATED.3IONS-SCNC: 13 MMOL/L (ref 7–15)
BUN SERPL-MCNC: 22.5 MG/DL (ref 8–23)
CALCIUM SERPL-MCNC: 9.3 MG/DL (ref 8.8–10.4)
CHLORIDE SERPL-SCNC: 104 MMOL/L (ref 98–107)
CREAT SERPL-MCNC: 1.1 MG/DL (ref 0.67–1.17)
EGFRCR SERPLBLD CKD-EPI 2021: 70 ML/MIN/1.73M2
EST. AVERAGE GLUCOSE BLD GHB EST-MCNC: 166 MG/DL
GLUCOSE SERPL-MCNC: 170 MG/DL (ref 70–99)
HBA1C MFR BLD: 7.4 % (ref 0–5.6)
HCO3 SERPL-SCNC: 22 MMOL/L (ref 22–29)
LDLC SERPL DIRECT ASSAY-MCNC: 86 MG/DL
POTASSIUM SERPL-SCNC: 4.5 MMOL/L (ref 3.4–5.3)
SODIUM SERPL-SCNC: 139 MMOL/L (ref 135–145)

## 2024-09-30 PROCEDURE — 83721 ASSAY OF BLOOD LIPOPROTEIN: CPT | Performed by: FAMILY MEDICINE

## 2024-09-30 PROCEDURE — G0008 ADMIN INFLUENZA VIRUS VAC: HCPCS | Performed by: FAMILY MEDICINE

## 2024-09-30 PROCEDURE — 80048 BASIC METABOLIC PNL TOTAL CA: CPT | Performed by: FAMILY MEDICINE

## 2024-09-30 PROCEDURE — 99214 OFFICE O/P EST MOD 30 MIN: CPT | Mod: 25 | Performed by: FAMILY MEDICINE

## 2024-09-30 PROCEDURE — 83036 HEMOGLOBIN GLYCOSYLATED A1C: CPT | Performed by: FAMILY MEDICINE

## 2024-09-30 PROCEDURE — 36415 COLL VENOUS BLD VENIPUNCTURE: CPT | Performed by: FAMILY MEDICINE

## 2024-09-30 PROCEDURE — 84460 ALANINE AMINO (ALT) (SGPT): CPT | Performed by: FAMILY MEDICINE

## 2024-09-30 PROCEDURE — 90662 IIV NO PRSV INCREASED AG IM: CPT | Performed by: FAMILY MEDICINE

## 2024-09-30 RX ORDER — LOSARTAN POTASSIUM 25 MG/1
25 TABLET ORAL DAILY
Qty: 90 TABLET | Refills: 0 | Status: SHIPPED | OUTPATIENT
Start: 2024-09-30

## 2024-09-30 ASSESSMENT — PAIN SCALES - GENERAL: PAINLEVEL: NO PAIN (0)

## 2024-09-30 NOTE — PROGRESS NOTES
Assessment & Plan     Type 2 diabetes mellitus with stage 3 chronic kidney disease, unspecified whether long term insulin use, unspecified whether stage 3a or 3b CKD (H)  Has been stable   - losartan (COZAAR) 25 MG tablet; Take 1 tablet (25 mg) by mouth daily.  - Hemoglobin A1c; Future  - Basic metabolic panel  (Ca, Cl, CO2, Creat, Gluc, K, Na, BUN); Future  - ALT; Future  - LDL cholesterol direct; Future    Benign essential hypertension  Worsening after holding off of lisinopril for angioedema, will have him to take ARBs and update us for BP  - losartan (COZAAR) 25 MG tablet; Take 1 tablet (25 mg) by mouth daily.  - Hemoglobin A1c; Future  - Basic metabolic panel  (Ca, Cl, CO2, Creat, Gluc, K, Na, BUN); Future  - ALT; Future  - LDL cholesterol direct; Future    Chronic kidney disease, stage 3a (H)  Stable   - Hemoglobin A1c; Future  - Basic metabolic panel  (Ca, Cl, CO2, Creat, Gluc, K, Na, BUN); Future  - ALT; Future  - LDL cholesterol direct; Future        MED REC REQUIRED  Post Medication Reconciliation Status:  Discharge medications reconciled, continue medications without change    FUTURE APPOINTMENTS:       - Follow-up visit in 3 months for BP    Danni Cotton is a 78 year old, presenting for the following health issues:  Hospital F/U        9/30/2024    10:31 AM   Additional Questions   Roomed by Mitchel ORTEGA       ED/UC Followup:    Facility:  [facility]  Date of visit: 9/25/24  Reason for visit: SWELLING, LIPS  Current Status: no more swelling         Review of Systems  Constitutional, HEENT, cardiovascular, pulmonary, GI, , musculoskeletal, neuro, skin, endocrine and psych systems are negative, except as otherwise noted.      Objective    BP (!) 189/84   Pulse 79   Temp 97.4  F (36.3  C) (Temporal)   Resp 10   Wt 67.6 kg (149 lb)   SpO2 99%   BMI 23.03 kg/m    Body mass index is 23.03 kg/m .  Physical Exam   GENERAL: alert and no distress  EYES: Eyes grossly  normal to inspection, PERRL and conjunctivae and sclerae normal  HENT: ear canals and TM's normal, nose and mouth without ulcers or lesions  NECK: no adenopathy, no asymmetry, masses, or scars  RESP: lungs clear to auscultation - no rales, rhonchi or wheezes  CV: regular rate and rhythm, normal S1 S2, no S3 or S4, no murmur, click or rub, no peripheral edema  ABDOMEN: soft, nontender, no hepatosplenomegaly, no masses and bowel sounds normal  MS: no gross musculoskeletal defects noted, no edema  SKIN: no suspicious lesions or rashes  NEURO: Normal strength and tone, mentation intact and speech normal  BACK: no CVA tenderness, no paralumbar tenderness            Signed Electronically by: Zhou Emmanuel MD

## 2024-10-01 RX ORDER — METFORMIN HCL 500 MG
1000 TABLET, EXTENDED RELEASE 24 HR ORAL
Qty: 180 TABLET | Refills: 1 | Status: SHIPPED | OUTPATIENT
Start: 2024-10-01

## 2024-10-01 RX ORDER — METFORMIN HYDROCHLORIDE 1000 MG/1
1000 TABLET, FILM COATED, EXTENDED RELEASE ORAL
Qty: 90 TABLET | Refills: 1 | Status: SHIPPED | OUTPATIENT
Start: 2024-10-01 | End: 2024-10-01

## 2024-11-04 ENCOUNTER — TRANSFERRED RECORDS (OUTPATIENT)
Dept: HEALTH INFORMATION MANAGEMENT | Facility: CLINIC | Age: 79
End: 2024-11-04
Payer: MEDICARE

## 2024-11-04 LAB — RETINOPATHY: NEGATIVE

## 2024-12-31 DIAGNOSIS — E11.9 TYPE 2 DIABETES MELLITUS WITHOUT COMPLICATION, UNSPECIFIED WHETHER LONG TERM INSULIN USE (H): ICD-10-CM

## 2024-12-31 RX ORDER — BLOOD SUGAR DIAGNOSTIC
STRIP MISCELLANEOUS
Qty: 100 STRIP | Refills: 0 | Status: SHIPPED | OUTPATIENT
Start: 2024-12-31

## 2025-03-17 DIAGNOSIS — E78.2 MIXED HYPERLIPIDEMIA: ICD-10-CM

## 2025-03-17 RX ORDER — SIMVASTATIN 20 MG
20 TABLET ORAL AT BEDTIME
Qty: 90 TABLET | Refills: 1 | Status: SHIPPED | OUTPATIENT
Start: 2025-03-17

## 2025-03-26 ENCOUNTER — PATIENT OUTREACH (OUTPATIENT)
Dept: CARE COORDINATION | Facility: CLINIC | Age: 80
End: 2025-03-26
Payer: MEDICARE

## 2025-04-09 ENCOUNTER — PATIENT OUTREACH (OUTPATIENT)
Dept: CARE COORDINATION | Facility: CLINIC | Age: 80
End: 2025-04-09
Payer: MEDICARE

## 2025-04-12 ENCOUNTER — HEALTH MAINTENANCE LETTER (OUTPATIENT)
Age: 80
End: 2025-04-12

## 2025-06-12 ENCOUNTER — TELEPHONE (OUTPATIENT)
Dept: FAMILY MEDICINE | Facility: CLINIC | Age: 80
End: 2025-06-12
Payer: MEDICARE

## 2025-06-12 DIAGNOSIS — N18.30 TYPE 2 DIABETES MELLITUS WITH STAGE 3 CHRONIC KIDNEY DISEASE, UNSPECIFIED WHETHER LONG TERM INSULIN USE, UNSPECIFIED WHETHER STAGE 3A OR 3B CKD (H): ICD-10-CM

## 2025-06-12 DIAGNOSIS — E11.9 TYPE 2 DIABETES MELLITUS WITHOUT COMPLICATION, UNSPECIFIED WHETHER LONG TERM INSULIN USE (H): ICD-10-CM

## 2025-06-12 DIAGNOSIS — E11.22 TYPE 2 DIABETES MELLITUS WITH STAGE 3 CHRONIC KIDNEY DISEASE, UNSPECIFIED WHETHER LONG TERM INSULIN USE, UNSPECIFIED WHETHER STAGE 3A OR 3B CKD (H): ICD-10-CM

## 2025-06-12 RX ORDER — BLOOD SUGAR DIAGNOSTIC
STRIP MISCELLANEOUS
Qty: 100 STRIP | Refills: 3 | Status: SHIPPED | OUTPATIENT
Start: 2025-06-12

## 2025-06-12 RX ORDER — METFORMIN HYDROCHLORIDE 500 MG/1
1000 TABLET, EXTENDED RELEASE ORAL
Qty: 180 TABLET | Refills: 0 | Status: SHIPPED | OUTPATIENT
Start: 2025-06-12

## 2025-06-14 ENCOUNTER — HEALTH MAINTENANCE LETTER (OUTPATIENT)
Age: 80
End: 2025-06-14

## 2025-06-16 NOTE — TELEPHONE ENCOUNTER
2nd Attempt - LM for PT to call back to schedule med check/ phy/ fasting labs w/ Dr. Emmanuel. Also informed PT 3 mo supplies sent.

## 2025-06-23 NOTE — TELEPHONE ENCOUNTER
[facility]AL[facility] and [employer] message sent to pt to schedule annual Px and fasting labwork.     Natalia Walters on 6/23/2025 at 5:11 PM

## 2025-07-21 ENCOUNTER — MEDICAL CORRESPONDENCE (OUTPATIENT)
Dept: HEALTH INFORMATION MANAGEMENT | Facility: CLINIC | Age: 80
End: 2025-07-21
Payer: MEDICARE

## 2025-07-22 ENCOUNTER — TELEPHONE (OUTPATIENT)
Dept: FAMILY MEDICINE | Facility: CLINIC | Age: 80
End: 2025-07-22
Payer: MEDICARE

## 2025-07-22 NOTE — TELEPHONE ENCOUNTER
Patient Quality Outreach    Patient is due for the following:   Diabetes -  A1C and Foot Exam  Physical Annual Wellness Visit    Action(s) Taken:   Schedule a Annual Wellness Visit    Type of outreach:    Phone, spoke to patient/parent. Pt is recovering from surgery, he also moved to [facility] and may switch providers. Advised pt to call back if he needs anything.     Questions for provider review:    None         Karly Bentley

## 2025-07-23 ENCOUNTER — TELEPHONE (OUTPATIENT)
Dept: FAMILY MEDICINE | Facility: CLINIC | Age: 80
End: 2025-07-23
Payer: MEDICARE

## 2025-07-23 NOTE — TELEPHONE ENCOUNTER
[facility] called requesting approval of home care orders. Triage will need to verify provider is following pt for Home care orders. Last OV within [place] 09/30/2024. Pt has been seen with [facility]. PT needs a call back with providers advice.       Home Care is calling regarding an established patient with [facility].  Requesting orders from: Zhou Emmanuel  PROVIDER AUTHORIZATION REQUIRED: RN unable to provide verbal approval for all orders.  See below for additional information.  RN will contact Home care with information after provider review.  Is this a request for a temporary pause in the home care episode?  No    Orders Requested    Skilled Nursing  2 wk 3  1 wk 3  1 PRN visit    Physical Therapy  Request for initial certification (first set of orders)   Frequency: 1x a wk for 2wks  RN gave verbal order: No: Triage will need to verify provider is following pt for home care orders. Last OV within [place] 09/30/2024      Speech Therapy for dysphagia  Request for initial evaluation and treatment (one time)   RN gave verbal order:  No:       Phone number Home Care can be reached at: [phone]  Okay to leave a detailed message?: Yes      Reese Amaro RN

## 2025-08-04 ENCOUNTER — MEDICAL CORRESPONDENCE (OUTPATIENT)
Dept: HEALTH INFORMATION MANAGEMENT | Facility: CLINIC | Age: 80
End: 2025-08-04
Payer: MEDICARE

## 2025-08-04 ENCOUNTER — TELEPHONE (OUTPATIENT)
Dept: FAMILY MEDICINE | Facility: CLINIC | Age: 80
End: 2025-08-04
Payer: MEDICARE